# Patient Record
Sex: MALE | Race: WHITE | Employment: OTHER | URBAN - METROPOLITAN AREA
[De-identification: names, ages, dates, MRNs, and addresses within clinical notes are randomized per-mention and may not be internally consistent; named-entity substitution may affect disease eponyms.]

---

## 2023-01-20 ENCOUNTER — TELEPHONE (OUTPATIENT)
Dept: FAMILY MEDICINE CLINIC | Age: 68
End: 2023-01-20

## 2023-01-20 NOTE — TELEPHONE ENCOUNTER
----- Message from Ye Burgos sent at 1/19/2023 12:50 PM EST -----  Subject: Appointment Request    Reason for Call: New Patient/New to Provider Appointment needed: New   Patient Request Appointment    QUESTIONS    Reason for appointment request? Requested Provider unavailable - Iglesia Peterson     Additional Information for Provider?  Pt would like to establish w/Dr Edmond Nichols (pt dghtr Wendolyn Runner already pt with him) no appts   available; screened green  ---------------------------------------------------------------------------  --------------  [id number]  [phone]; OK to leave message on voicemail  ---------------------------------------------------------------------------  --------------  SCRIPT ANSWERS  COVID Screen: Mari Heard

## 2023-01-26 ENCOUNTER — OFFICE VISIT (OUTPATIENT)
Dept: FAMILY MEDICINE CLINIC | Age: 68
End: 2023-01-26
Payer: MEDICARE

## 2023-01-26 VITALS
OXYGEN SATURATION: 97 % | HEIGHT: 76 IN | TEMPERATURE: 97.5 F | DIASTOLIC BLOOD PRESSURE: 83 MMHG | SYSTOLIC BLOOD PRESSURE: 126 MMHG | HEART RATE: 55 BPM | BODY MASS INDEX: 26.06 KG/M2 | WEIGHT: 214 LBS

## 2023-01-26 DIAGNOSIS — H02.66 XANTHELASMA OF EYELID, BILATERAL: ICD-10-CM

## 2023-01-26 DIAGNOSIS — J45.30 MILD PERSISTENT ASTHMA WITHOUT COMPLICATION: ICD-10-CM

## 2023-01-26 DIAGNOSIS — H02.63 XANTHELASMA OF EYELID, BILATERAL: ICD-10-CM

## 2023-01-26 DIAGNOSIS — Z12.5 SPECIAL SCREENING FOR MALIGNANT NEOPLASM OF PROSTATE: ICD-10-CM

## 2023-01-26 DIAGNOSIS — L57.0 AK (ACTINIC KERATOSIS): Primary | ICD-10-CM

## 2023-01-26 DIAGNOSIS — Z13.6 SCREENING FOR ISCHEMIC HEART DISEASE: ICD-10-CM

## 2023-01-26 DIAGNOSIS — Z00.00 INITIAL MEDICARE ANNUAL WELLNESS VISIT: ICD-10-CM

## 2023-01-26 DIAGNOSIS — Z13.1 SCREENING FOR DIABETES MELLITUS: ICD-10-CM

## 2023-01-26 PROBLEM — J45.20 ASTHMA, MILD INTERMITTENT: Status: ACTIVE | Noted: 2023-01-26

## 2023-01-26 PROCEDURE — G8536 NO DOC ELDER MAL SCRN: HCPCS | Performed by: FAMILY MEDICINE

## 2023-01-26 PROCEDURE — 1101F PT FALLS ASSESS-DOCD LE1/YR: CPT | Performed by: FAMILY MEDICINE

## 2023-01-26 PROCEDURE — G8510 SCR DEP NEG, NO PLAN REQD: HCPCS | Performed by: FAMILY MEDICINE

## 2023-01-26 PROCEDURE — 3017F COLORECTAL CA SCREEN DOC REV: CPT | Performed by: FAMILY MEDICINE

## 2023-01-26 PROCEDURE — G0438 PPPS, INITIAL VISIT: HCPCS | Performed by: FAMILY MEDICINE

## 2023-01-26 PROCEDURE — 99203 OFFICE O/P NEW LOW 30 MIN: CPT | Performed by: FAMILY MEDICINE

## 2023-01-26 PROCEDURE — 1123F ACP DISCUSS/DSCN MKR DOCD: CPT | Performed by: FAMILY MEDICINE

## 2023-01-26 PROCEDURE — G8427 DOCREV CUR MEDS BY ELIG CLIN: HCPCS | Performed by: FAMILY MEDICINE

## 2023-01-26 PROCEDURE — G8417 CALC BMI ABV UP PARAM F/U: HCPCS | Performed by: FAMILY MEDICINE

## 2023-01-26 RX ORDER — VARDENAFIL HYDROCHLORIDE 10 MG/1
10 TABLET ORAL
COMMUNITY

## 2023-01-26 NOTE — PATIENT INSTRUCTIONS
Medicare Wellness Visit, Male    The best way to live healthy is to have a lifestyle where you eat a well-balanced diet, exercise regularly, limit alcohol use, and quit all forms of tobacco/nicotine, if applicable. Regular preventive services are another way to keep healthy. Preventive services (vaccines, screening tests, monitoring & exams) can help personalize your care plan, which helps you manage your own care. Screening tests can find health problems at the earliest stages, when they are easiest to treat. [facility] follows the current, evidence-based guidelines published by the [facility] Jhony Marilou ([facility]) when recommending preventive services for our patients. Because we follow these guidelines, sometimes recommendations change over time as research supports it. (For example, a prostate screening blood test is no longer routinely recommended for men with no symptoms). Of course, you and your doctor may decide to screen more often for some diseases, based on your risk and co-morbidities (chronic disease you are already diagnosed with). Preventive services for you include:  - Medicare offers their members a free annual wellness visit, which is time for you and your primary care provider to discuss and plan for your preventive service needs.  Take advantage of this benefit every year!    -Over the age of 72 should receive the recommended pneumonia vaccines.   -All adults should have a flu vaccine yearly.  -All adults should receive a tetanus vaccine every 10 years.  -Over the age of 48 should receive the shingles vaccines.    -All adults should be screened once for Hepatitis C.  -All adults age 38-68 who are overweight should have a diabetes screening test once every three years.   -Other screening tests & preventive services for persons with diabetes include: an eye exam to screen for diabetic retinopathy, a kidney function test, a foot exam, and stricter control over your cholesterol.   -Cardiovascular screening for adults with routine risk involves an electrocardiogram (ECG) at intervals determined by the provider.     -Colorectal cancer screening should be done for adults age 43-69 with no increased risk factors for colorectal cancer. There are a number of acceptable methods of screening for this type of cancer. Each test has its own benefits and drawbacks. Discuss with your provider what is most appropriate for you during your annual wellness visit. The different tests include: colonoscopy (considered the best screening method), a fecal occult blood test, a fecal DNA test, and sigmoidoscopy.    -Lung cancer screening is recommended annually with a low dose CT scan for adults between age 54 and 68, who have smoked at least 30 pack years (equivalent of 1 pack per day for 30 days), and who is a current smoker or quit less than 15 years ago. -An Abdominal Aortic Aneurysm (AAA) Screening is recommended for men age 73-68 who has ever smoked in their lifetime.      Here is a list of your current Health Maintenance items (your personalized list of preventive services) with a due date:  Health Maintenance Due   Topic Date Due    Hepatitis C Test  Never done    COVID-19 Vaccine (1) Never done    DTaP/Tdap/Td  (1 - Tdap) Never done    Cholesterol Test   Never done    Colorectal Screening  Never done    Shingles Vaccine (1 of 2) Never done    Pneumococcal Vaccine (1 - PCV) Never done    Yearly Flu Vaccine (1) Never done

## 2023-01-26 NOTE — PROGRESS NOTES
Identified pt with two pt identifiers. Reviewed record in preparation for visit and have obtained necessary documentation. All patient medications has been reviewed. Chief Complaint   Patient presents with    New Patient    Establish Care     Additional information about chief complaint:    Visit Vitals  /83 (BP 1 Location: Left upper arm, BP Patient Position: Sitting, BP Cuff Size: Adult)   Pulse (!) 55   Temp 97.5 °F (36.4 °C) (Temporal)   Ht 6' 4\" (1.93 m)   Wt 214 lb (97.1 kg)   SpO2 97%   BMI 26.05 kg/m²       Health Maintenance Due   Topic    Hepatitis C Screening     Depression Screen     COVID-19 Vaccine (1)    DTaP/Tdap/Td series (1 - Tdap)    Lipid Screen     Colorectal Cancer Screening Combo     Shingles Vaccine (1 of 2)    Pneumococcal 65+ years (1 - PCV)    Flu Vaccine (1)    Medicare Yearly Exam        1. Have you been to the ER, urgent care clinic since your last visit? Hospitalized since your last visit? N/a    2. Have you seen or consulted any other health care providers outside of the [address] [place] [address] since your last visit? Include any pap smears or colon screening.  N/a

## 2023-01-26 NOTE — PROGRESS NOTES
Family Medicine Initial Office Visit  Patient: Tisha Ness  1955, 75 y.o., male  Encounter Date: 1/26/2023    ASSESSMENT & PLAN  75 y.o. male with asthma presenting for intake appointment today as well as medicare wellness visit initial. Well controlled asthma. No immediate concerns but has a couple skin questions. Findings consistent with xanthelasmas of eyelids which could be a sign of hyperlipidemia/dyslipidemia. Forehead scaling consistent with Actinic keratosis. Recommend dermatology for Aks and discuss bags under eyes. We will obtain lipid screening and glucose for cardiovascular and diabetes risk, and PSA for age and background; no indication for ROCKY today. Patient should follow up with us in 2 weeks after labs resulted to discuss results. Chronic Conditions Addressed Today       1. Asthma, mild persistent     Other Problems Addressed Today       AK (actinic keratosis)    -  Primary        Relevant Orders        REFERRAL TO DERMATOLOGY    Xanthelasma of eyelid, bilateral        Initial Medicare annual wellness visit        Screening for diabetes mellitus            Relevant Orders        GLUCOSE, RANDOM    Screening for ischemic heart disease            Relevant Orders        LIPID PANEL    Special screening for malignant neoplasm of prostate        Discussed the potential benefits and harms of the prostate-specific antigen (PSA) serum level test as a screening tool for early prostate cancer detection. Relevant Orders        PSA SCREENING (SCREENING)              Orders Placed This Encounter    Glucose, Random ([id number]) - Future Default     Standing Status:   Future     Standing Expiration Date:   1/26/2024     Order Specific Question:   Has the patient fasted?      Answer:   Yes     Order Specific Question:   Patient Height     Answer:   6     Order Specific Question:   Patient Weight     Answer:   214    Lipid Panel ([id number]) - Future Default     Standing Status:   Future     Standing Expiration Date:   1/26/2024    PSA Screening-Future  ([id number])     Standing Status:   Future     Standing Expiration Date:   7/25/2023    REFERRAL TO DERMATOLOGY     Referral Priority:   Routine     Referral Type:   Consultation     Referral Reason:   Specialty Services Required     Referred to Provider:   Rusty Gil MD     Requested Specialty:   Dermatology Physician     Number of Visits Requested:   1    vardenafiL (LEVITRA) 10 mg tablet     Sig: Take 10 mg by mouth daily as needed. Patient Instructions   Medicare Wellness Visit, Male    The best way to live healthy is to have a lifestyle where you eat a well-balanced diet, exercise regularly, limit alcohol use, and quit all forms of tobacco/nicotine, if applicable. Regular preventive services are another way to keep healthy. Preventive services (vaccines, screening tests, monitoring & exams) can help personalize your care plan, which helps you manage your own care. Screening tests can find health problems at the earliest stages, when they are easiest to treat. [facility] follows the current, evidence-based guidelines published by the [facility]hony Marilou ([facility]) when recommending preventive services for our patients. Because we follow these guidelines, sometimes recommendations change over time as research supports it. (For example, a prostate screening blood test is no longer routinely recommended for men with no symptoms). Of course, you and your doctor may decide to screen more often for some diseases, based on your risk and co-morbidities (chronic disease you are already diagnosed with). Preventive services for you include:  - Medicare offers their members a free annual wellness visit, which is time for you and your primary care provider to discuss and plan for your preventive service needs.  Take advantage of this benefit every year!    -Over the age of 72 should receive the recommended pneumonia vaccines.   -All adults should have a flu vaccine yearly.  -All adults should receive a tetanus vaccine every 10 years.  -Over the age of 48 should receive the shingles vaccines.    -All adults should be screened once for Hepatitis C.  -All adults age 38-68 who are overweight should have a diabetes screening test once every three years.   -Other screening tests & preventive services for persons with diabetes include: an eye exam to screen for diabetic retinopathy, a kidney function test, a foot exam, and stricter control over your cholesterol.   -Cardiovascular screening for adults with routine risk involves an electrocardiogram (ECG) at intervals determined by the provider.     -Colorectal cancer screening should be done for adults age 43-69 with no increased risk factors for colorectal cancer. There are a number of acceptable methods of screening for this type of cancer. Each test has its own benefits and drawbacks. Discuss with your provider what is most appropriate for you during your annual wellness visit. The different tests include: colonoscopy (considered the best screening method), a fecal occult blood test, a fecal DNA test, and sigmoidoscopy.    -Lung cancer screening is recommended annually with a low dose CT scan for adults between age 54 and 68, who have smoked at least 30 pack years (equivalent of 1 pack per day for 30 days), and who is a current smoker or quit less than 15 years ago. -An Abdominal Aortic Aneurysm (AAA) Screening is recommended for men age 73-68 who has ever smoked in their lifetime.      Here is a list of your current Health Maintenance items (your personalized list of preventive services) with a due date:  Health Maintenance Due   Topic Date Due    Hepatitis C Test  Never done    COVID-19 Vaccine (1) Never done    DTaP/Tdap/Td  (1 - Tdap) Never done    Cholesterol Test   Never done    Colorectal Screening  Never done    Shingles Vaccine (1 of 2) Never done Pneumococcal Vaccine (1 - PCV) Never done    Yearly Flu Vaccine (1) Never done     CHIEF COMPLAINT  Chief Complaint   Patient presents with    New Patient    [address] is a 75 y.o. male presenting today for establishing care. 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 Wanting to start performing at open boom nights at [facility]. Has a recording studio in the house. Patient lives in a house in the [place] with Jing Kramer and her daughter Kassi Wagoner. Just moved from [place] to [place]. Patient was last seen by primary care couple years ago. Dr. Jack Martins at [facility] in [facility]. Patient last saw a dentist last year. Looking a new one in [facility]. Patient last had eye exam recently, no concerns, no cataracts to intervene on. Exercise: not too active now, but during warm months he goes hiking. Has a gym in the new house. Rowing machine and incline treadmill. Diet / Weight:mostly vegetables with mixed meats. Avoid sugars except for Peanut M&Ms. Tobacco: never  EtOH: 4 glass/week  Illicit Substances: not in a long time; in his 25s he did acid a little bit, and some other substances like peyote. Sexual Activity:yes with partner of 5 years Lindsey. Will take a Levitra 10mg once in a while. Review of Systems   Constitutional: Negative. HENT: Negative. Eyes: Negative. Respiratory: Negative. Cardiovascular: Negative. Gastrointestinal: Negative. Endocrine: Negative. Genitourinary: Negative. Musculoskeletal: Negative. Neurological: Negative. Hematological: Negative. Psychiatric/Behavioral: Negative. Chronic Conditions Addressed Today       1. Asthma, mild persistent     Overview      Uses a daily diskus.           Acute Diagnoses Addressed Today       AK (actinic keratosis)    -  Primary        Relevant Orders        REFERRAL TO DERMATOLOGY    Xanthelasma of eyelid, bilateral        Initial Medicare annual wellness visit        Screening for diabetes mellitus            Relevant Orders        GLUCOSE, RANDOM    Screening for ischemic heart disease            Relevant Orders        LIPID PANEL    Special screening for malignant neoplasm of prostate        Discussed the potential benefits and harms of the prostate-specific antigen (PSA) serum level test as a screening tool for early prostate cancer detection. Relevant Orders        PSA SCREENING (SCREENING)             OBJECTIVE  Visit Vitals  /83 (BP 1 Location: Left upper arm, BP Patient Position: Sitting, BP Cuff Size: Adult)   Pulse (!) 55   Temp 97.5 °F (36.4 °C) (Temporal)   Ht 6' 4\" (1.93 m)   Wt 214 lb (97.1 kg)   SpO2 97%   BMI 26.05 kg/m²       Physical Exam  Vitals reviewed. Constitutional:       General: He is not in acute distress. Appearance: Normal appearance. He is not ill-appearing. HENT:      Head: Normocephalic and atraumatic. Right Ear: Tympanic membrane, ear canal and external ear normal.      Left Ear: Tympanic membrane, ear canal and external ear normal.      Nose: Nose normal.      Mouth/Throat:      Mouth: Mucous membranes are moist.      Pharynx: Oropharynx is clear. No posterior oropharyngeal erythema. Eyes:      General:         Right eye: No discharge. Left eye: No discharge. Extraocular Movements: Extraocular movements intact. Conjunctiva/sclera: Conjunctivae normal.      Pupils: Pupils are equal, round, and reactive to light. Neck:      Vascular: No carotid bruit. Cardiovascular:      Rate and Rhythm: Normal rate and regular rhythm. Pulses: Normal pulses. Heart sounds: No murmur heard. No friction rub. No gallop. Pulmonary:      Effort: Pulmonary effort is normal. No respiratory distress. Breath sounds: Normal breath sounds. No wheezing, rhonchi or rales. Abdominal:      General: Bowel sounds are normal. There is no distension.       Palpations: Abdomen is soft. Tenderness: There is no abdominal tenderness. There is no guarding. Musculoskeletal:         General: No swelling, tenderness, deformity or signs of injury. Normal range of motion. Cervical back: Normal range of motion. No rigidity or tenderness. Lymphadenopathy:      Cervical: No cervical adenopathy. Skin:     General: Skin is warm and dry. Capillary Refill: Capillary refill takes less than 2 seconds. Coloration: Skin is not jaundiced. Findings: No bruising, erythema or rash. Comments: Xanthelasmas bilateral under eyes. Actinic keratosis x3-4 on forehead   Neurological:      General: No focal deficit present. Mental Status: He is alert and oriented to person, place, and time. Mental status is at baseline. Sensory: No sensory deficit. Motor: No weakness. Coordination: Coordination normal.   Psychiatric:         Mood and Affect: Mood normal.         Behavior: Behavior normal.         Thought Content: Thought content normal.         Judgment: Judgment normal.     No results found for any visits on 01/26/23. HISTORICAL  Reviewed and updated today, and as noted below:    History reviewed. No pertinent past medical history. Past Surgical History:   Procedure Laterality Date    HX ORTHOPAEDIC       Family History   Problem Relation Age of Onset    Cancer Mother     Arthritis-rheumatoid Father      Social History     Tobacco Use   Smoking Status Never   Smokeless Tobacco Never     Social History     Socioeconomic History    Marital status: SINGLE   Tobacco Use    Smoking status: Never    Smokeless tobacco: Never   Vaping Use    Vaping Use: Never used   Substance and Sexual Activity    Alcohol use: Yes     Comment: 1-2 glasses a wine a week    Drug use: Never     No Known Allergies    No results found for any previous visit.          Judi Harrison MD  [facility]  01/26/23 3:01 PM    Total time on the date of encounter exceeded 30 minutes and included patient care, coordination of care, charting and preparation for visit. Portions of this note may have been populated using smart dictation software and may have \"sounds-like\" errors present. Pt was counseled on risks, benefits and alternatives of treatment options. All questions were asked and answered and the patient was agreeable with the treatment plan as outlined. This is an Initial Medicare Annual Wellness Exam (AWV) (Performed 12 months after IPPE or effective date of Medicare Part B enrollment, Once in a lifetime)    I have reviewed the patient's medical history in detail and updated the computerized patient record. Assessment/Plan   Education and counseling provided:  Are appropriate based on today's review and evaluation  Prostate cancer screening tests (PSA, covered annually)  Colorectal cancer screening tests  Cardiovascular screening blood test  Diabetes screening test    1. AK (actinic keratosis)  -     REFERRAL TO DERMATOLOGY  2. Mild persistent asthma without complication  3. Xanthelasma of eyelid, bilateral  4. Initial Medicare annual wellness visit  5. Screening for diabetes mellitus  -     GLUCOSE, RANDOM; Future  6. Screening for ischemic heart disease  -     LIPID PANEL; Future  7. Special screening for malignant neoplasm of prostate  Comments:  Discussed the potential benefits and harms of the prostate-specific antigen (PSA) serum level test as a screening tool for early prostate cancer detection. Orders:  -     PSA SCREENING (SCREENING);  Future       Depression Risk Factor Screening     3 most recent PHQ Screens 1/26/2023   Little interest or pleasure in doing things Not at all   Feeling down, depressed, irritable, or hopeless Not at all   Total Score PHQ 2 0       Alcohol & Drug Abuse Risk Screen    Do you average more than 1 drink per night or more than 7 drinks a week: No    In the past three months have you have had more than 4 drinks containing alcohol on one occasion: No          Functional Ability and Level of Safety    Hearing: Hearing is good. Activities of Daily Living: The home contains: handrails and grab bars  Patient does total self care     Ambulation: with no difficulty      Fall Risk:  Fall Risk Assessment, last 12 mths 1/26/2023   Able to walk? Yes   Fall in past 12 months? 0      Abuse Screen:  Patient is not abused       Cognitive Screening    Has your family/caregiver stated any concerns about your memory: no     Cognitive Screening: Normal - Clock Drawing Test    Health Maintenance Due     Health Maintenance Due   Topic Date Due    Hepatitis C Screening  Never done    COVID-19 Vaccine (1) Never done    DTaP/Tdap/Td series (1 - Tdap) Never done    Lipid Screen  Never done    Colorectal Cancer Screening Combo  Never done    Shingles Vaccine (1 of 2) Never done    Pneumococcal 65+ years (1 - PCV) Never done    Flu Vaccine (1) Never done       Patient Care Team   Patient Care Team:  Ramírez Boston MD as PCP - General (Family Medicine)    History     Patient Active Problem List   Diagnosis Code    Asthma, mild persistent J45.30     History reviewed. No pertinent past medical history. Past Surgical History:   Procedure Laterality Date    HX ORTHOPAEDIC       Current Outpatient Medications   Medication Sig Dispense Refill    vardenafiL (LEVITRA) 10 mg tablet Take 10 mg by mouth daily as needed.        No Known Allergies    Family History   Problem Relation Age of Onset    Cancer Mother     Arthritis-rheumatoid Father      Social History     Tobacco Use    Smoking status: Never    Smokeless tobacco: Never   Substance Use Topics    Alcohol use: Yes     Comment: 1-2 glasses a wine a week       Fco Patel MD

## 2023-02-09 ENCOUNTER — OFFICE VISIT (OUTPATIENT)
Dept: FAMILY MEDICINE CLINIC | Age: 68
End: 2023-02-09
Payer: MEDICARE

## 2023-02-09 VITALS
WEIGHT: 210 LBS | BODY MASS INDEX: 25.57 KG/M2 | RESPIRATION RATE: 16 BRPM | SYSTOLIC BLOOD PRESSURE: 116 MMHG | HEIGHT: 76 IN | HEART RATE: 67 BPM | TEMPERATURE: 97 F | OXYGEN SATURATION: 97 % | DIASTOLIC BLOOD PRESSURE: 82 MMHG

## 2023-02-09 DIAGNOSIS — E78.5 HYPERLIPIDEMIA, MILD: Primary | ICD-10-CM

## 2023-02-09 DIAGNOSIS — H02.66 XANTHELASMA OF EYELID, BILATERAL: ICD-10-CM

## 2023-02-09 DIAGNOSIS — H02.63 XANTHELASMA OF EYELID, BILATERAL: ICD-10-CM

## 2023-02-09 DIAGNOSIS — N52.9 ERECTILE DYSFUNCTION, UNSPECIFIED ERECTILE DYSFUNCTION TYPE: ICD-10-CM

## 2023-02-09 PROBLEM — L57.0 AK (ACTINIC KERATOSIS): Status: ACTIVE | Noted: 2023-02-09

## 2023-02-09 PROCEDURE — 1101F PT FALLS ASSESS-DOCD LE1/YR: CPT | Performed by: FAMILY MEDICINE

## 2023-02-09 PROCEDURE — G8427 DOCREV CUR MEDS BY ELIG CLIN: HCPCS | Performed by: FAMILY MEDICINE

## 2023-02-09 PROCEDURE — G8510 SCR DEP NEG, NO PLAN REQD: HCPCS | Performed by: FAMILY MEDICINE

## 2023-02-09 PROCEDURE — 3017F COLORECTAL CA SCREEN DOC REV: CPT | Performed by: FAMILY MEDICINE

## 2023-02-09 PROCEDURE — 1123F ACP DISCUSS/DSCN MKR DOCD: CPT | Performed by: FAMILY MEDICINE

## 2023-02-09 PROCEDURE — G8417 CALC BMI ABV UP PARAM F/U: HCPCS | Performed by: FAMILY MEDICINE

## 2023-02-09 PROCEDURE — 99215 OFFICE O/P EST HI 40 MIN: CPT | Performed by: FAMILY MEDICINE

## 2023-02-09 PROCEDURE — G8536 NO DOC ELDER MAL SCRN: HCPCS | Performed by: FAMILY MEDICINE

## 2023-02-09 RX ORDER — ROSUVASTATIN CALCIUM 5 MG/1
5 TABLET, COATED ORAL
Qty: 60 TABLET | Refills: 1 | Status: SHIPPED | OUTPATIENT
Start: 2023-02-09

## 2023-02-09 RX ORDER — FLUTICASONE PROPIONATE AND SALMETEROL 250; 50 UG/1; UG/1
1 POWDER RESPIRATORY (INHALATION) EVERY 12 HOURS
COMMUNITY
End: 2023-02-09 | Stop reason: SDUPTHER

## 2023-02-09 RX ORDER — FLUTICASONE PROPIONATE AND SALMETEROL 250; 50 UG/1; UG/1
1 POWDER RESPIRATORY (INHALATION) EVERY 12 HOURS
Qty: 180 EACH | Refills: 3 | Status: SHIPPED | OUTPATIENT
Start: 2023-02-09

## 2023-02-09 RX ORDER — VARDENAFIL HYDROCHLORIDE 10 MG/1
10 TABLET ORAL
Qty: 80 TABLET | Refills: 0 | Status: SHIPPED | OUTPATIENT
Start: 2023-02-09

## 2023-02-09 NOTE — PROGRESS NOTES
Christine Lopez (: 1955) is a 75 y.o. male, established patient, here for evaluation of the following chief complaint(s):  Follow-up (Fu lab results and needs refills /)       ASSESSMENT/PLAN:  Below is the assessment and plan developed based on review of pertinent history, physical exam, labs, studies, and medications. 1. Hyperlipidemia, mild  Assessment & Plan:   uncontrolled, changes made today: we will start statin therapy, with titration up to moderate/high intensity. Start Crestor 5mg nightly, recheck lipids in 2 months  We spent a good portion of the visit discussing the relationship between xanthelasma, erectile dysfunction, and cardiovascular health, as well las discussing the risk/benefits of statin therapy. Orders:  -     LIPID PANEL; Future  2. Erectile dysfunction, unspecified erectile dysfunction type  Assessment & Plan:   at goal, continue current medications  3. Xanthelasma of eyelid, bilateral  Assessment & Plan:  Not looking for removal today. Patient will start statin for cholesterol therapy, and this may improve skin findings. Patient will also see dermatology soon for AKs and can ask about removal from them. Orders:  -     LIPID PANEL; Future    The 10-year ASCVD risk score (Lyssa CHANG, et al., 2019) is: 12.9%    Values used to calculate the score:      Age: 75 years      Sex: Male      Is Non- : No      Diabetic: No      Tobacco smoker: No      Systolic Blood Pressure: 251 mmHg      Is BP treated: No      HDL Cholesterol: 56 MG/DL      Total Cholesterol: 204 MG/DL    No follow-ups on file. SUBJECTIVE/OBJECTIVE:  HPI  Chronic Conditions Addressed Today       1. Erectile dysfunction     Overview      Organic, seems to have slightly worsened over a decade. Responsive to 10mg of vardenafil but occasionally needs two tablets.                     2. Hyperlipidemia, mild - Primary     Overview      Lab Results   Component Value Date/Time    Cholesterol, total 204 (H) 01/30/2023 04:00 PM    HDL Cholesterol 56 01/30/2023 04:00 PM    LDL, calculated 128.4 (H) 01/30/2023 04:00 PM    VLDL, calculated 19.6 01/30/2023 04:00 PM    Triglyceride 98 01/30/2023 04:00 PM    CHOL/HDL Ratio 3.6 01/30/2023 04:00 PM   The 10-year ASCVD risk score (Lyssa DK, et al., 2019) is: 12.9%    Values used to calculate the score:      Age: 75 years      Sex: Male      Is Non- : No      Diabetic: No      Tobacco smoker: No      Systolic Blood Pressure: 761 mmHg      Is BP treated: No      HDL Cholesterol: 56 MG/DL      Total Cholesterol: 204 MG/DL                                          3. Xanthelasma of eyelid, bilateral     Overview      Found on first visit. Labs show elevated , patient noticing another one forming now. No definite family history of ASCVD. Review of Systems  As per HPI    Physical Exam  Visit Vitals  /82   Pulse 67   Temp 97 °F (36.1 °C)   Resp 16   Ht 6' 4\" (1.93 m)   Wt 210 lb (95.3 kg)   SpO2 97%   BMI 25.56 kg/m²     Constitutional: well appearing, no acute distress  Cardiac: normal rate, regular rhythm. Pulm: normal rate and effort  Skin: normal color and turgor. Xanthelasmas bilateral lower eyelids. On this date 02/09/2023 I have spent 40 minutes reviewing previous notes, test results and face to face with the patient discussing the diagnosis and importance of compliance with the treatment plan as well as documenting on the day of the visit. An electronic signature was used to authenticate this note.   -- Radha Kelly MD

## 2023-02-09 NOTE — ASSESSMENT & PLAN NOTE
Not looking for removal today. Patient will start statin for cholesterol therapy, and this may improve skin findings. Patient will also see dermatology soon for AKs and can ask about removal from them.

## 2023-02-09 NOTE — ASSESSMENT & PLAN NOTE
uncontrolled, changes made today: we will start statin therapy, with titration up to moderate/high intensity.  Start Crestor 5mg nightly, recheck lipids in 2 months

## 2023-04-20 LAB
CHOLEST SERPL-MCNC: 175 MG/DL (ref 100–199)
HDLC SERPL-MCNC: 66 MG/DL
IMP & REVIEW OF LAB RESULTS: NORMAL
LDLC SERPL CALC-MCNC: 98 MG/DL (ref 0–99)
TRIGL SERPL-MCNC: 58 MG/DL (ref 0–149)
VLDLC SERPL CALC-MCNC: 11 MG/DL (ref 5–40)

## 2023-04-29 DIAGNOSIS — E78.5 HYPERLIPIDEMIA, MILD: Primary | ICD-10-CM

## 2023-05-02 ENCOUNTER — OFFICE VISIT (OUTPATIENT)
Dept: FAMILY MEDICINE CLINIC | Age: 68
End: 2023-05-02
Payer: MEDICARE

## 2023-05-02 VITALS
BODY MASS INDEX: 25.82 KG/M2 | TEMPERATURE: 98.2 F | DIASTOLIC BLOOD PRESSURE: 89 MMHG | HEIGHT: 76 IN | HEART RATE: 62 BPM | WEIGHT: 212 LBS | SYSTOLIC BLOOD PRESSURE: 126 MMHG | RESPIRATION RATE: 16 BRPM

## 2023-05-02 DIAGNOSIS — E78.5 HYPERLIPIDEMIA, MILD: Primary | ICD-10-CM

## 2023-05-02 PROCEDURE — 99214 OFFICE O/P EST MOD 30 MIN: CPT | Performed by: FAMILY MEDICINE

## 2023-05-02 PROCEDURE — 1123F ACP DISCUSS/DSCN MKR DOCD: CPT | Performed by: FAMILY MEDICINE

## 2023-05-02 PROCEDURE — G8536 NO DOC ELDER MAL SCRN: HCPCS | Performed by: FAMILY MEDICINE

## 2023-05-02 PROCEDURE — G8427 DOCREV CUR MEDS BY ELIG CLIN: HCPCS | Performed by: FAMILY MEDICINE

## 2023-05-02 PROCEDURE — 3017F COLORECTAL CA SCREEN DOC REV: CPT | Performed by: FAMILY MEDICINE

## 2023-05-02 PROCEDURE — G8417 CALC BMI ABV UP PARAM F/U: HCPCS | Performed by: FAMILY MEDICINE

## 2023-05-02 PROCEDURE — 1101F PT FALLS ASSESS-DOCD LE1/YR: CPT | Performed by: FAMILY MEDICINE

## 2023-05-02 PROCEDURE — G8510 SCR DEP NEG, NO PLAN REQD: HCPCS | Performed by: FAMILY MEDICINE

## 2023-05-02 RX ORDER — ROSUVASTATIN CALCIUM 5 MG/1
5 TABLET, COATED ORAL
Qty: 12 TABLET | Refills: 2 | Status: SHIPPED | OUTPATIENT
Start: 2023-05-02

## 2023-08-22 NOTE — TELEPHONE ENCOUNTER
Faxed refill request for Vardenafil 10 mg (generic equivalent for Levitra) received from [facility]. See faxed form placed in provider's bin. Can nurse please pend medication to provider?  Ronald

## 2023-08-24 RX ORDER — VARDENAFIL HYDROCHLORIDE 10 MG/1
10 TABLET ORAL DAILY PRN
Qty: 90 TABLET | Refills: 3 | Status: SHIPPED | OUTPATIENT
Start: 2023-08-24

## 2023-08-30 ENCOUNTER — TELEPHONE (OUTPATIENT)
Facility: CLINIC | Age: 68
End: 2023-08-30

## 2023-10-11 ENCOUNTER — TELEPHONE (OUTPATIENT)
Facility: CLINIC | Age: 68
End: 2023-10-11

## 2023-10-11 ENCOUNTER — HOSPITAL ENCOUNTER (EMERGENCY)
Facility: HOSPITAL | Age: 68
Discharge: HOME | End: 2023-10-11
Attending: NURSE PRACTITIONER | Admitting: NURSE PRACTITIONER
Payer: MEDICARE

## 2023-10-11 VITALS
RESPIRATION RATE: 16 BRPM | HEIGHT: 76 IN | OXYGEN SATURATION: 97 % | WEIGHT: 210 LBS | SYSTOLIC BLOOD PRESSURE: 140 MMHG | BODY MASS INDEX: 25.57 KG/M2 | TEMPERATURE: 98.4 F | HEART RATE: 70 BPM | DIASTOLIC BLOOD PRESSURE: 92 MMHG

## 2023-10-11 DIAGNOSIS — B08.4 HAND, FOOT AND MOUTH DISEASE: ICD-10-CM

## 2023-10-11 DIAGNOSIS — I89.1: ICD-10-CM

## 2023-10-11 LAB
ALBUMIN SERPL BCG-MCNC: 3.6 G/DL (ref 3.5–5.2)
ALP SERPL-CCNC: 231 U/L (ref 40–129)
ALT SERPL W P-5'-P-CCNC: 35 U/L (ref 0–70)
ANION GAP SERPL CALCULATED.3IONS-SCNC: 10 MMOL/L (ref 7–15)
AST SERPL W P-5'-P-CCNC: 35 U/L (ref 0–45)
BASO+EOS+MONOS # BLD AUTO: ABNORMAL 10*3/UL
BASO+EOS+MONOS NFR BLD AUTO: ABNORMAL %
BASOPHILS # BLD AUTO: 0 10E3/UL (ref 0–0.2)
BASOPHILS NFR BLD AUTO: 1 %
BILIRUB SERPL-MCNC: 0.5 MG/DL
BUN SERPL-MCNC: 16.5 MG/DL (ref 8–23)
CALCIUM SERPL-MCNC: 9.7 MG/DL (ref 8.8–10.2)
CHLORIDE SERPL-SCNC: 99 MMOL/L (ref 98–107)
CREAT SERPL-MCNC: 0.73 MG/DL (ref 0.67–1.17)
DEPRECATED HCO3 PLAS-SCNC: 25 MMOL/L (ref 22–29)
EGFRCR SERPLBLD CKD-EPI 2021: >90 ML/MIN/1.73M2
EOSINOPHIL # BLD AUTO: 0.1 10E3/UL (ref 0–0.7)
EOSINOPHIL NFR BLD AUTO: 2 %
ERYTHROCYTE [DISTWIDTH] IN BLOOD BY AUTOMATED COUNT: 12.9 % (ref 10–15)
GLUCOSE SERPL-MCNC: 110 MG/DL (ref 70–99)
HCT VFR BLD AUTO: 39 % (ref 40–53)
HGB BLD-MCNC: 13.1 G/DL (ref 13.3–17.7)
HOLD SPECIMEN: NORMAL
IMM GRANULOCYTES # BLD: 0.1 10E3/UL
IMM GRANULOCYTES NFR BLD: 1 %
LYMPHOCYTES # BLD AUTO: 1.4 10E3/UL (ref 0.8–5.3)
LYMPHOCYTES NFR BLD AUTO: 16 %
MCH RBC QN AUTO: 29.2 PG (ref 26.5–33)
MCHC RBC AUTO-ENTMCNC: 33.6 G/DL (ref 31.5–36.5)
MCV RBC AUTO: 87 FL (ref 78–100)
MONOCYTES # BLD AUTO: 0.8 10E3/UL (ref 0–1.3)
MONOCYTES NFR BLD AUTO: 9 %
NEUTROPHILS # BLD AUTO: 6.2 10E3/UL (ref 1.6–8.3)
NEUTROPHILS NFR BLD AUTO: 71 %
NRBC # BLD AUTO: 0 10E3/UL
NRBC BLD AUTO-RTO: 0 /100
PLATELET # BLD AUTO: 219 10E3/UL (ref 150–450)
POTASSIUM SERPL-SCNC: 4.4 MMOL/L (ref 3.4–5.3)
PROT SERPL-MCNC: 7.5 G/DL (ref 6.4–8.3)
RBC # BLD AUTO: 4.49 10E6/UL (ref 4.4–5.9)
SODIUM SERPL-SCNC: 134 MMOL/L (ref 135–145)
WBC # BLD AUTO: 8.7 10E3/UL (ref 4–11)

## 2023-10-11 PROCEDURE — 36415 COLL VENOUS BLD VENIPUNCTURE: CPT | Performed by: NURSE PRACTITIONER

## 2023-10-11 PROCEDURE — 99213 OFFICE O/P EST LOW 20 MIN: CPT | Performed by: NURSE PRACTITIONER

## 2023-10-11 PROCEDURE — G0463 HOSPITAL OUTPT CLINIC VISIT: HCPCS

## 2023-10-11 PROCEDURE — 80053 COMPREHEN METABOLIC PANEL: CPT | Performed by: NURSE PRACTITIONER

## 2023-10-11 PROCEDURE — 85025 COMPLETE CBC W/AUTO DIFF WBC: CPT | Performed by: NURSE PRACTITIONER

## 2023-10-11 RX ORDER — NYSTATIN 100000/ML
500000 SUSPENSION, ORAL (FINAL DOSE FORM) ORAL 4 TIMES DAILY
Qty: 280 ML | Refills: 0 | Status: SHIPPED | OUTPATIENT
Start: 2023-10-11 | End: 2023-10-25

## 2023-10-11 RX ORDER — CEPHALEXIN 500 MG/1
500 CAPSULE ORAL 4 TIMES DAILY
Qty: 20 CAPSULE | Refills: 0 | Status: SHIPPED | OUTPATIENT
Start: 2023-10-11 | End: 2023-10-16

## 2023-10-11 ASSESSMENT — ENCOUNTER SYMPTOMS
DIARRHEA: 0
NAUSEA: 0
SINUS PAIN: 1
RHINORRHEA: 0
SHORTNESS OF BREATH: 0
ACTIVITY CHANGE: 1
SORE THROAT: 0
MYALGIAS: 1
COUGH: 0
CHILLS: 1
HEADACHES: 1
APPETITE CHANGE: 0
FEVER: 1
VOMITING: 1
SINUS PRESSURE: 1
FATIGUE: 1

## 2023-10-11 NOTE — ED PROVIDER NOTES
"  History     Chief Complaint   Patient presents with    not feeling well     HPI  Rogelio Garcia is a 68 year old male who is accompanied per his wife.  He presents with a 1 week history of chills, fatigue, fever, mouth sores, sinus pain and pressure, body aches, and a headache.  Has taken Advil and Tylenol this morning that does help with the discomfort.  Exposed to COVID 2 weeks ago.  Had a negative home COVID test today.  Otherwise, no known sick contacts.  No recent vaccines; has had initial 2 COVID vaccines.  Smokes cigars; does not inhale.  Denies nausea, vomiting, diarrhea, and shortness of breath.  Denies tick bites.     Allergies:  No Known Allergies    Problem List:    There are no problems to display for this patient.       Past Medical History:    History reviewed. No pertinent past medical history.    Past Surgical History:    History reviewed. No pertinent surgical history.    Family History:    History reviewed. No pertinent family history.    Social History:  Marital Status:   [2]        Medications:    cephALEXin (KEFLEX) 500 MG capsule  nystatin (MYCOSTATIN) 617914 UNIT/ML suspension          Review of Systems   Constitutional:  Positive for activity change, chills, fatigue and fever. Negative for appetite change.   HENT:  Positive for mouth sores, sinus pressure and sinus pain. Negative for ear pain, rhinorrhea and sore throat.    Eyes:         Eyes hurt at times   Respiratory:  Negative for cough and shortness of breath.    Gastrointestinal:  Positive for vomiting (none in past 24 hours). Negative for diarrhea and nausea.   Genitourinary: Negative.    Musculoskeletal:  Positive for myalgias.   Skin:         Pimples on back   Neurological:  Positive for headaches.       Physical Exam   BP: 140/92  Pulse: 70  Temp: 98.4  F (36.9  C)  Resp: 16  Height: 193 cm (6' 4\")  Weight: 95.3 kg (210 lb)  SpO2: 97 %      Physical Exam  Vitals and nursing note reviewed.   Constitutional:       " General: He is in acute distress (Mild to moderate).      Appearance: He is normal weight.   HENT:      Head: Normocephalic.      Jaw: There is normal jaw occlusion.      Right Ear: Tympanic membrane and ear canal normal.      Left Ear: Tympanic membrane and ear canal normal.      Nose: Nose normal.      Right Sinus: No maxillary sinus tenderness or frontal sinus tenderness.      Left Sinus: No maxillary sinus tenderness or frontal sinus tenderness.      Mouth/Throat:      Lips: Pink.      Mouth: Mucous membranes are moist.      Tongue: Lesions present.      Pharynx: Uvula midline. No posterior oropharyngeal erythema.     Eyes:      Conjunctiva/sclera: Conjunctivae normal.   Cardiovascular:      Rate and Rhythm: Normal rate and regular rhythm.      Heart sounds: Normal heart sounds. No murmur heard.  Pulmonary:      Effort: Pulmonary effort is normal. No respiratory distress.      Breath sounds: Normal breath sounds. No wheezing or rales.   Lymphadenopathy:      Cervical: No cervical adenopathy.   Skin:     General: Skin is warm and dry.      Findings: Erythema and rash present. Rash is macular and papular.          Neurological:      Mental Status: He is alert and oriented to person, place, and time.   Psychiatric:         Behavior: Behavior normal.         ED Course                 Procedures             Results for orders placed or performed during the hospital encounter of 10/11/23 (from the past 24 hour(s))   CBC with platelets differential    Narrative    The following orders were created for panel order CBC with platelets differential.  Procedure                               Abnormality         Status                     ---------                               -----------         ------                     CBC with platelets and d...[[id number]]  Abnormal            Final result                 Please view results for these tests on the individual orders.   Comprehensive metabolic panel   Result Value Ref  Range    Sodium 134 (L) 135 - 145 mmol/L    Potassium 4.4 3.4 - 5.3 mmol/L    Carbon Dioxide (CO2) 25 22 - 29 mmol/L    Anion Gap 10 7 - 15 mmol/L    Urea Nitrogen 16.5 8.0 - 23.0 mg/dL    Creatinine 0.73 0.67 - 1.17 mg/dL    GFR Estimate >90 >60 mL/min/1.73m2    Calcium 9.7 8.8 - 10.2 mg/dL    Chloride 99 98 - 107 mmol/L    Glucose 110 (H) 70 - 99 mg/dL    Alkaline Phosphatase 231 (H) 40 - 129 U/L    AST 35 0 - 45 U/L    ALT 35 0 - 70 U/L    Protein Total 7.5 6.4 - 8.3 g/dL    Albumin 3.6 3.5 - 5.2 g/dL    Bilirubin Total 0.5 <=1.2 mg/dL   CBC with platelets and differential   Result Value Ref Range    WBC Count 8.7 4.0 - 11.0 10e3/uL    RBC Count 4.49 4.40 - 5.90 10e6/uL    Hemoglobin 13.1 (L) 13.3 - 17.7 g/dL    Hematocrit 39.0 (L) 40.0 - 53.0 %    MCV 87 78 - 100 fL    MCH 29.2 26.5 - 33.0 pg    MCHC 33.6 31.5 - 36.5 g/dL    RDW 12.9 10.0 - 15.0 %    Platelet Count 219 150 - 450 10e3/uL    % Neutrophils 71 %    % Lymphocytes 16 %    % Monocytes 9 %    Mids % (Monos, Eos, Basos)      % Eosinophils 2 %    % Basophils 1 %    % Immature Granulocytes 1 %    NRBCs per 100 WBC 0 <1 /100    Absolute Neutrophils 6.2 1.6 - 8.3 10e3/uL    Absolute Lymphocytes 1.4 0.8 - 5.3 10e3/uL    Absolute Monocytes 0.8 0.0 - 1.3 10e3/uL    Mids Abs (Monos, Eos, Basos)      Absolute Eosinophils 0.1 0.0 - 0.7 10e3/uL    Absolute Basophils 0.0 0.0 - 0.2 10e3/uL    Absolute Immature Granulocytes 0.1 <=0.4 10e3/uL    Absolute NRBCs 0.0 10e3/uL   Extra Tube    Narrative    The following orders were created for panel order Extra Tube.  Procedure                               Abnormality         Status                     ---------                               -----------         ------                     Extra Red Top Tube[[id number]]                               Final result                 Please view results for these tests on the individual orders.   Extra Red Top Tube   Result Value Ref Range    Hold Specimen JIC        Medications -  No data to display    Assessments & Plan (with Medical Decision Making)     I have reviewed the nursing notes.    I have reviewed the findings, diagnosis, plan and need for follow up with the patient.  (B08.4) Hand, foot and mouth disease    (I89.1) Lymphangitis of back  Comment: 68 year old male who is accompanied per his wife.  He presents with a 1 week history of chills, fatigue, fever, mouth sores, sinus pain and pressure, body aches, and a headache.  Has taken Advil and Tylenol this morning that does help with the discomfort.  Exposed to COVID 2 weeks ago.  Had a negative home COVID test today.  Otherwise, no known sick contacts.  No recent vaccines; has had initial 2 COVID vaccines.  Smokes cigars; does not inhale.  Denies nausea, vomiting, diarrhea, and shortness of breath.  Denies tick bites.     MDM:NHT. Lungs CTA  Multiple macular/papular, erythematous rash on back.  Right lateral chest/back has 2 elongated vertical strips of mild erythema.  See media    CBC negative with the exception of slightly low hemoglobin 13.1; hematocrit 39  CMP  negative with the exception of mild hyponatremia 134; glucose 110; alkaline phosphatase 231    Plan: Cephalexin 4 times daily for 5 days and nystatin swish and spit.  Education provided and/or discussed for this/these medications, lymphangitis, and hand/foot/mouth disease.  -Increase fluids.   -Complete all antibiotics even if feeling better.    -Taking antibiotics with food may decrease the symptoms, of an upset stomach, that can occur when taking antibiotics. Antibiotics frequently cause diarrhea. Probiotics or yogurt may help prevent or decrease these symptoms.   -Return to be reevaluated if symptoms do not improve, or worsen.    Nystatin swish and spit 4 times daily.  Continue to use medication for 2 days after  your mouth sores resolved.    Follow-up with primary care provider in [facility].  These discharge instructions and medications were reviewed with him  and his wife and understanding verbalized.    This document was prepared using a combination of typing and voice generated software.  While every attempt was made for accuracy, spelling and grammatical errors may exist.    Discharge Medication List as of 10/11/2023  2:44 PM        START taking these medications    Details   cephALEXin (KEFLEX) 500 MG capsule Take 1 capsule (500 mg) by mouth 4 times daily for 5 days, Disp-20 capsule, R-0, E-Prescribe      nystatin (MYCOSTATIN) [id number] UNIT/ML suspension Take 5 mLs (500,000 Units) by mouth 4 times daily for 14 daysDisp-280 mL, [id number]             Final diagnoses:   Hand, foot and mouth disease   Lymphangitis of back       10/11/2023   [facility]         Maria E Tao, CNP  10/11/23 [id number]

## 2023-10-11 NOTE — TELEPHONE ENCOUNTER
Patient is out of town and has been sick for 6 days with body aches, cold sores, chills and temperature.  Tested negative for Covid and asking for a Z=pack to be sent in     [address] [facility]  [address]    Please reach out to patient   Reza  [phone]

## 2023-10-11 NOTE — ED TRIAGE NOTES
Patient presents to urgent care with wife for not feeling well. He describes it as having a fever, hot/cold  flashes, body aches, spots on back, tongue looks bad, cold sores on lip. These symptoms started last Thursday.  Patient took home COVID test which was negative.

## 2023-10-11 NOTE — TELEPHONE ENCOUNTER
Called pt and advised Dr. Salo Reyes needs to see him for appointment prior to prescribing antibiotics or he may go to urgent care for evaluation and treatment since [place]. Pt agrees to plan.  Ronald

## 2023-10-11 NOTE — DISCHARGE INSTRUCTIONS
-Increase fluids.   -Complete all antibiotics even if feeling better.    -Taking antibiotics with food may decrease the symptoms, of an upset stomach, that can occur when taking antibiotics. Antibiotics frequently cause diarrhea. Probiotics or yogurt may help prevent or decrease these symptoms.   -Return to be reevaluated if symptoms do not improve, or worsen.    Nystatin swish and spit 4 times daily.  Continue to use medication for 2 days after  your mouth sores resolved.    Follow-up with primary care provider

## 2023-10-13 ENCOUNTER — HOSPITAL ENCOUNTER (EMERGENCY)
Facility: HOSPITAL | Age: 68
Discharge: HOME | End: 2023-10-13
Attending: NURSE PRACTITIONER | Admitting: NURSE PRACTITIONER
Payer: MEDICARE

## 2023-10-13 ENCOUNTER — APPOINTMENT (OUTPATIENT)
Dept: GENERAL RADIOLOGY | Facility: HOSPITAL | Age: 68
End: 2023-10-13
Attending: NURSE PRACTITIONER
Payer: MEDICARE

## 2023-10-13 VITALS
OXYGEN SATURATION: 97 % | HEART RATE: 82 BPM | DIASTOLIC BLOOD PRESSURE: 68 MMHG | RESPIRATION RATE: 18 BRPM | TEMPERATURE: 97.5 F | SYSTOLIC BLOOD PRESSURE: 126 MMHG | HEIGHT: 76 IN | WEIGHT: 210.1 LBS | BODY MASS INDEX: 25.58 KG/M2

## 2023-10-13 DIAGNOSIS — R52 GENERALIZED BODY ACHES: Primary | ICD-10-CM

## 2023-10-13 DIAGNOSIS — R21 RASH: ICD-10-CM

## 2023-10-13 DIAGNOSIS — R05.1 ACUTE COUGH: ICD-10-CM

## 2023-10-13 DIAGNOSIS — B37.0 THRUSH: ICD-10-CM

## 2023-10-13 DIAGNOSIS — R63.4 WEIGHT LOSS: ICD-10-CM

## 2023-10-13 LAB
ALBUMIN SERPL BCG-MCNC: 3.5 G/DL (ref 3.5–5.2)
ALBUMIN UR-MCNC: 30 MG/DL
ALP SERPL-CCNC: 216 U/L (ref 40–129)
ALT SERPL W P-5'-P-CCNC: 45 U/L (ref 0–70)
ANION GAP SERPL CALCULATED.3IONS-SCNC: 10 MMOL/L (ref 7–15)
APPEARANCE UR: CLEAR
AST SERPL W P-5'-P-CCNC: 47 U/L (ref 0–45)
BASO+EOS+MONOS # BLD AUTO: ABNORMAL 10*3/UL
BASO+EOS+MONOS NFR BLD AUTO: ABNORMAL %
BASOPHILS # BLD AUTO: 0 10E3/UL (ref 0–0.2)
BASOPHILS NFR BLD AUTO: 1 %
BILIRUB SERPL-MCNC: 0.6 MG/DL
BILIRUB UR QL STRIP: NEGATIVE
BUN SERPL-MCNC: 14.5 MG/DL (ref 8–23)
CALCIUM SERPL-MCNC: 9.6 MG/DL (ref 8.8–10.2)
CHLORIDE SERPL-SCNC: 96 MMOL/L (ref 98–107)
COLOR UR AUTO: YELLOW
CREAT SERPL-MCNC: 0.76 MG/DL (ref 0.67–1.17)
CRP SERPL-MCNC: 160.68 MG/L
DEPRECATED HCO3 PLAS-SCNC: 27 MMOL/L (ref 22–29)
EGFRCR SERPLBLD CKD-EPI 2021: >90 ML/MIN/1.73M2
EOSINOPHIL # BLD AUTO: 0.1 10E3/UL (ref 0–0.7)
EOSINOPHIL NFR BLD AUTO: 1 %
ERYTHROCYTE [DISTWIDTH] IN BLOOD BY AUTOMATED COUNT: 12.9 % (ref 10–15)
GLUCOSE SERPL-MCNC: 113 MG/DL (ref 70–99)
GLUCOSE UR STRIP-MCNC: NEGATIVE MG/DL
HCT VFR BLD AUTO: 39.3 % (ref 40–53)
HGB BLD-MCNC: 13.3 G/DL (ref 13.3–17.7)
HGB UR QL STRIP: NEGATIVE
HOLD SPECIMEN: NORMAL
HYALINE CASTS: 3 /LPF
IMM GRANULOCYTES # BLD: 0.1 10E3/UL
IMM GRANULOCYTES NFR BLD: 1 %
KETONES UR STRIP-MCNC: NEGATIVE MG/DL
LEUKOCYTE ESTERASE UR QL STRIP: ABNORMAL
LYMPHOCYTES # BLD AUTO: 1.2 10E3/UL (ref 0.8–5.3)
LYMPHOCYTES NFR BLD AUTO: 15 %
MCH RBC QN AUTO: 29.6 PG (ref 26.5–33)
MCHC RBC AUTO-ENTMCNC: 33.8 G/DL (ref 31.5–36.5)
MCV RBC AUTO: 87 FL (ref 78–100)
MONOCYTES # BLD AUTO: 0.7 10E3/UL (ref 0–1.3)
MONOCYTES NFR BLD AUTO: 8 %
MUCOUS THREADS #/AREA URNS LPF: PRESENT /LPF
NEUTROPHILS # BLD AUTO: 6.1 10E3/UL (ref 1.6–8.3)
NEUTROPHILS NFR BLD AUTO: 74 %
NITRATE UR QL: NEGATIVE
NRBC # BLD AUTO: 0 10E3/UL
NRBC BLD AUTO-RTO: 0 /100
PH UR STRIP: 5.5 [PH] (ref 4.7–8)
PLATELET # BLD AUTO: 299 10E3/UL (ref 150–450)
POTASSIUM SERPL-SCNC: 4.3 MMOL/L (ref 3.4–5.3)
PROT SERPL-MCNC: 7.9 G/DL (ref 6.4–8.3)
RBC # BLD AUTO: 4.5 10E6/UL (ref 4.4–5.9)
RBC URINE: <1 /HPF
SODIUM SERPL-SCNC: 133 MMOL/L (ref 135–145)
SP GR UR STRIP: 1.03 (ref 1–1.03)
SQUAMOUS EPITHELIAL: 0 /HPF
TSH SERPL DL<=0.005 MIU/L-ACNC: 2.41 UIU/ML (ref 0.3–4.2)
UROBILINOGEN UR STRIP-MCNC: 2 MG/DL
WBC # BLD AUTO: 8.3 10E3/UL (ref 4–11)
WBC URINE: 2 /HPF

## 2023-10-13 PROCEDURE — 85025 COMPLETE CBC W/AUTO DIFF WBC: CPT | Performed by: NURSE PRACTITIONER

## 2023-10-13 PROCEDURE — 99213 OFFICE O/P EST LOW 20 MIN: CPT | Performed by: NURSE PRACTITIONER

## 2023-10-13 PROCEDURE — 87015 SPECIMEN INFECT AGNT CONCNTJ: CPT | Performed by: NURSE PRACTITIONER

## 2023-10-13 PROCEDURE — 84443 ASSAY THYROID STIM HORMONE: CPT | Performed by: NURSE PRACTITIONER

## 2023-10-13 PROCEDURE — 86140 C-REACTIVE PROTEIN: CPT | Performed by: NURSE PRACTITIONER

## 2023-10-13 PROCEDURE — 86618 LYME DISEASE ANTIBODY: CPT | Performed by: NURSE PRACTITIONER

## 2023-10-13 PROCEDURE — 87469 BABESIA MICROTI AMP PRB: CPT | Performed by: NURSE PRACTITIONER

## 2023-10-13 PROCEDURE — 36415 COLL VENOUS BLD VENIPUNCTURE: CPT | Performed by: NURSE PRACTITIONER

## 2023-10-13 PROCEDURE — 87468 ANAPLSMA PHGCYTOPHLM AMP PRB: CPT | Performed by: NURSE PRACTITIONER

## 2023-10-13 PROCEDURE — G0463 HOSPITAL OUTPT CLINIC VISIT: HCPCS | Mod: 25

## 2023-10-13 PROCEDURE — 80053 COMPREHEN METABOLIC PANEL: CPT | Performed by: NURSE PRACTITIONER

## 2023-10-13 PROCEDURE — 87449 NOS EACH ORGANISM AG IA: CPT | Mod: XU | Performed by: NURSE PRACTITIONER

## 2023-10-13 PROCEDURE — 71046 X-RAY EXAM CHEST 2 VIEWS: CPT

## 2023-10-13 PROCEDURE — 81001 URINALYSIS AUTO W/SCOPE: CPT | Performed by: NURSE PRACTITIONER

## 2023-10-13 RX ORDER — ROSUVASTATIN CALCIUM 5 MG/1
5 TABLET, COATED ORAL WEEKLY
COMMUNITY

## 2023-10-13 RX ORDER — ALBUTEROL SULFATE 90 UG/1
2 AEROSOL, METERED RESPIRATORY (INHALATION) EVERY 6 HOURS PRN
COMMUNITY

## 2023-10-13 ASSESSMENT — ACTIVITIES OF DAILY LIVING (ADL): ADLS_ACUITY_SCORE: 35

## 2023-10-13 ASSESSMENT — ENCOUNTER SYMPTOMS
VOMITING: 0
TREMORS: 0
SINUS PRESSURE: 0
HEMATURIA: 0
NAUSEA: 0
SORE THROAT: 0
ABDOMINAL PAIN: 0
TROUBLE SWALLOWING: 0
DYSURIA: 0
MYALGIAS: 1
DIZZINESS: 1
DIAPHORESIS: 1
WEAKNESS: 0
SHORTNESS OF BREATH: 1
DIARRHEA: 0
SINUS PAIN: 0
COUGH: 1
FEVER: 0
UNEXPECTED WEIGHT CHANGE: 1

## 2023-10-13 NOTE — DISCHARGE INSTRUCTIONS
The antigen test and tickborne illness tests are still pending.  We will notify you of these results when they are available.  Your chest x-ray does not show any concerning signs of pneumonia at this time.  Your thyroid function is normal.    Take Tylenol or ibuprofen as needed for pain.  Continue use of your inhalers.    Return to urgent care or emergency department for any worsening or concerning symptoms.

## 2023-10-13 NOTE — ED PROVIDER NOTES
History     Chief Complaint   Patient presents with    Cough     Cough and body aches     HPI  Rogelio Garcia is a 68 year old male who presents ambulatory to urgent care with his wife with concerns of persistent infection.  Patient reports night sweats, cough, shortness of breath, body aches, rash, dizziness, fevers and weight loss (20 pounds in 2 weeks).  Symptoms initially started 9 days ago.  Fever has since resolved.  Cough and shortness of breath started about 2 days ago.  Patient was seen in the urgent care about 2 days ago and was diagnosed with hand-foot-and-mouth disease as well as lymphangitis of his back and is currently being treated with cephalexin and nystatin mouthwash.  He notes that the pain in his mouth has gotten better.  The red streaks on his back are also improving.  Rest of the symptoms are persisting.  Patient is in this area visiting from the [facility] and will be going back home next month.    He tells me that he has been in the woods a lot and is concerned that he may have inhaled fungal spores and has developed blastomycosis which prompted today's visit.  He denies any known tick bites.    Past medical history most significant for hyperlipidemia and asthma.    No current tobacco use.  Denies any cardiac history.    No chest pain, abdominal pain, nausea, vomiting or diarrhea.  No dysuria or hematuria but notes that his urine is very dark.      Allergies:  No Known Allergies    Problem List:    There are no problems to display for this patient.       Past Medical History:    History reviewed. No pertinent past medical history.    Past Surgical History:    History reviewed. No pertinent surgical history.    Family History:    History reviewed. No pertinent family history.    Social History:  Marital Status:   [2]        Medications:    albuterol (PROAIR HFA/PROVENTIL HFA/VENTOLIN HFA) 108 (90 Base) MCG/ACT inhaler  cephALEXin (KEFLEX) 500 MG capsule  nystatin  "(MYCOSTATIN) 677898 UNIT/ML suspension  rosuvastatin (CRESTOR) 5 MG tablet          Review of Systems   Constitutional:  Positive for diaphoresis and unexpected weight change. Negative for fever (resolved).   HENT:  Negative for congestion, ear pain, sinus pressure, sinus pain, sore throat and trouble swallowing.    Respiratory:  Positive for cough and shortness of breath.    Cardiovascular:  Negative for chest pain.   Gastrointestinal:  Negative for abdominal pain, diarrhea, nausea and vomiting.   Genitourinary:  Negative for dysuria and hematuria.   Musculoskeletal:  Positive for myalgias.   Skin:  Positive for rash.   Neurological:  Positive for dizziness. Negative for tremors and weakness.   All other systems reviewed and are negative.      Physical Exam   BP: 126/68  Pulse: 82  Temp: 97.5  F (36.4  C)  Resp: 18  Height: 193 cm (6' 4\")  Weight: 95.3 kg (210 lb 1.6 oz)  SpO2: 97 %      Physical Exam  Vitals and nursing note reviewed.   Constitutional:       General: He is not in acute distress.     Appearance: Normal appearance. He is well-developed. He is not ill-appearing, toxic-appearing or diaphoretic.   HENT:      Head: Normocephalic and atraumatic.      Right Ear: Tympanic membrane and ear canal normal.      Left Ear: Tympanic membrane and ear canal normal.      Nose: Nose normal.      Mouth/Throat:      Mouth: Mucous membranes are moist.   Eyes:      Extraocular Movements: Extraocular movements intact.      Pupils: Pupils are equal, round, and reactive to light.   Cardiovascular:      Rate and Rhythm: Normal rate and regular rhythm.      Heart sounds: Normal heart sounds.   Pulmonary:      Effort: Pulmonary effort is normal. No respiratory distress.      Breath sounds: Normal breath sounds. No wheezing or rhonchi.   Musculoskeletal:         General: Normal range of motion.      Cervical back: Normal range of motion and neck supple.   Skin:     General: Skin is warm and dry.      Findings: Rash present. No " bruising or erythema.   Neurological:      General: No focal deficit present.      Mental Status: He is alert and oriented to person, place, and time.      Motor: No weakness.      Coordination: Coordination normal.      Gait: Gait normal.   Psychiatric:         Mood and Affect: Mood normal.         ED Course   Differential diagnosis not limited to: Tick borne illness, hand-foot-and-mouth disease, pneumonia, blastomycosis, hyper thyroidism, viral illness.              Procedures              Results for orders placed or performed during the hospital encounter of 10/13/23 (from the past 24 hour(s))   UA with Microscopic reflex to Culture    Specimen: Urine, Midstream   Result Value Ref Range    Color Urine Yellow Colorless, Straw, Light Yellow, Yellow    Appearance Urine Clear Clear    Glucose Urine Negative Negative mg/dL    Bilirubin Urine Negative Negative    Ketones Urine Negative Negative mg/dL    Specific Gravity Urine 1.029 1.003 - 1.035    Blood Urine Negative Negative    pH Urine 5.5 4.7 - 8.0    Protein Albumin Urine 30 (A) Negative mg/dL    Urobilinogen Urine 2.0 Normal, 2.0 mg/dL    Nitrite Urine Negative Negative    Leukocyte Esterase Urine Small (A) Negative    Mucus Urine Present (A) None Seen /LPF    RBC Urine <1 <=2 /HPF    WBC Urine 2 <=5 /HPF    Squamous Epithelials Urine 0 <=1 /HPF    Hyaline Casts Urine 3 (H) <=2 /LPF    Narrative    Urine Culture not indicated   CBC with platelets differential    Narrative    The following orders were created for panel order CBC with platelets differential.  Procedure                               Abnormality         Status                     ---------                               -----------         ------                     CBC with platelets and d...[[id number]]  Abnormal            Final result                 Please view results for these tests on the individual orders.   Comprehensive metabolic panel   Result Value Ref Range    Sodium 133 (L) 135 - 145  mmol/L    Potassium 4.3 3.4 - 5.3 mmol/L    Carbon Dioxide (CO2) 27 22 - 29 mmol/L    Anion Gap 10 7 - 15 mmol/L    Urea Nitrogen 14.5 8.0 - 23.0 mg/dL    Creatinine 0.76 0.67 - 1.17 mg/dL    GFR Estimate >90 >60 mL/min/1.73m2    Calcium 9.6 8.8 - 10.2 mg/dL    Chloride 96 (L) 98 - 107 mmol/L    Glucose 113 (H) 70 - 99 mg/dL    Alkaline Phosphatase 216 (H) 40 - 129 U/L    AST 47 (H) 0 - 45 U/L    ALT 45 0 - 70 U/L    Protein Total 7.9 6.4 - 8.3 g/dL    Albumin 3.5 3.5 - 5.2 g/dL    Bilirubin Total 0.6 <=1.2 mg/dL   CRP inflammation   Result Value Ref Range    CRP Inflammation 160.68 (H) <5.00 mg/L   TSH with free T4 reflex   Result Value Ref Range    TSH 2.41 0.30 - 4.20 uIU/mL   CBC with platelets and differential   Result Value Ref Range    WBC Count 8.3 4.0 - 11.0 10e3/uL    RBC Count 4.50 4.40 - 5.90 10e6/uL    Hemoglobin 13.3 13.3 - 17.7 g/dL    Hematocrit 39.3 (L) 40.0 - 53.0 %    MCV 87 78 - 100 fL    MCH 29.6 26.5 - 33.0 pg    MCHC 33.8 31.5 - 36.5 g/dL    RDW 12.9 10.0 - 15.0 %    Platelet Count 299 150 - 450 10e3/uL    % Neutrophils 74 %    % Lymphocytes 15 %    % Monocytes 8 %    Mids % (Monos, Eos, Basos)      % Eosinophils 1 %    % Basophils 1 %    % Immature Granulocytes 1 %    NRBCs per 100 WBC 0 <1 /100    Absolute Neutrophils 6.1 1.6 - 8.3 10e3/uL    Absolute Lymphocytes 1.2 0.8 - 5.3 10e3/uL    Absolute Monocytes 0.7 0.0 - 1.3 10e3/uL    Mids Abs (Monos, Eos, Basos)      Absolute Eosinophils 0.1 0.0 - 0.7 10e3/uL    Absolute Basophils 0.0 0.0 - 0.2 10e3/uL    Absolute Immature Granulocytes 0.1 <=0.4 10e3/uL    Absolute NRBCs 0.0 10e3/uL   XR Chest 2 Views    Narrative    XR CHEST 2 VIEWS    HISTORY: 68 years Male sob, cough x 10 days.    COMPARISON: None    TECHNIQUE: 2 views of the chest were obtained.    FINDINGS: Two views of the chest were obtained. Heart size and  pulmonary vascularity are within normal limits, lungs are clear on  both views. No consolidating air space opacities are  present.          Impression    IMPRESSION: Clear chest.    MELVIN RUTLEDGE MD         SYSTEM ID:  [id number]       Medications - No data to display    Assessments & Plan (with Medical Decision Making)  This is a well-appearing and pleasant 68-year-old male that presented with concerns of blastomycosis after he developed concerning symptoms that started 9 days ago.  Patient believes that he did inhale some fungal spores while he was out in the woods prior to symptom onset.  Recently evaluated in the urgent care and started on cephalexin and nystatin for lymphangitis of his back and oral candidiasis.  Denies any known tick bites.  Differentials included but not limited to tickborne illnesses, blastomycosis, pneumonia, hyperthyroidism, viral illness.  His vital signs are within normal limits.  His lab findings are without leukocytosis or anemia.  No electrolyte abnormalities.  Normal kidney function.  Very mildly elevated AST.  Alk phos is trending down in comparison to 2 days ago.  Normal TSH.  Urinalysis negative for infection.  His CRP was noted to be significantly elevated at 160.68.  Tickborne panel and Blastomyces antigen results are  pending  As these are send out labs.  His chest x-ray was negative for any acute findings.  I discussed all findings with patient noting a broad diagnosis.  Patient was noted to have thrush and is currently taking nystatin noting his symptoms are improving.  He did have some red streaks to his upper back which are gradually improving since he started cephalexin.  He does have a nonspecific rash to his back which could be consistent with his recent diagnosis of HFM 2 days ago.  At this time patient feels the symptoms are somewhat improving.  He was advised to continue finishing of the cephalexin and nystatin.  He will be notified of pending results when they are available and any new treatment plan will be discussed at that time.  Tylenol or ibuprofen can be taken for pain. Patient  does not plan on returning back home to the [facility] until next month.  He was advised that if his symptoms worsen or he develops any other concerning symptoms he are to return back to urgent care/emergency department for evaluation.       I have reviewed the nursing notes.    I have reviewed the findings, diagnosis, plan and need for follow up with the patient.    Medical Decision Making  The patient's presentation was of moderate complexity (an acute illness with systemic symptoms).    The patient's evaluation involved:  review of external note(s) from 1 sources (urgent care visit on 10/11/2023)  review of 2 test result(s) ordered prior to this encounter (CBC and CMP)  ordering and/or review of 3+ test(s) in this encounter (see separate area of note for details)    The patient's management necessitated only low risk treatment.    This document was prepared using a combination of typing and voice generated software.  While every attempt was made for accuracy, spelling and grammatical errors may exist.     New Prescriptions    No medications on file       Final diagnoses:   Generalized body aches   Weight loss   Acute cough   Thrush   Rash       10/13/2023   [facility]       Mpofu, Prudence, CNP  10/13/23 1700

## 2023-10-16 LAB
A PHAGOCYTOPH DNA BLD QL NAA+PROBE: NOT DETECTED
ANAPLASMA BLD MOD GIEMSA: NEGATIVE
B BURGDOR IGG+IGM SER QL: 0.19
B MICROTI BLD SMEAR: NEGATIVE
B MICROTI DNA BLD QL NAA+PROBE: NOT DETECTED
BABESIA DNA BLD QL NAA+PROBE: NOT DETECTED
E CHAFFEENSIS DNA BLD QL NAA+PROBE: NOT DETECTED
E EWINGII DNA SPEC QL NAA+PROBE: NOT DETECTED
EHRLICHIA DNA SPEC QL NAA+PROBE: NOT DETECTED
EHRLICHIA SPEC QL MICRO: NEGATIVE

## 2023-10-18 LAB — SCANNED LAB RESULT: NORMAL

## 2023-10-19 ENCOUNTER — APPOINTMENT (OUTPATIENT)
Dept: CT IMAGING | Facility: HOSPITAL | Age: 68
End: 2023-10-19
Attending: EMERGENCY MEDICINE
Payer: MEDICARE

## 2023-10-19 ENCOUNTER — HOSPITAL ENCOUNTER (EMERGENCY)
Facility: HOSPITAL | Age: 68
Discharge: HOME | End: 2023-10-19
Attending: EMERGENCY MEDICINE | Admitting: EMERGENCY MEDICINE
Payer: MEDICARE

## 2023-10-19 VITALS
HEART RATE: 79 BPM | DIASTOLIC BLOOD PRESSURE: 100 MMHG | TEMPERATURE: 99.4 F | WEIGHT: 202 LBS | RESPIRATION RATE: 18 BRPM | SYSTOLIC BLOOD PRESSURE: 120 MMHG | BODY MASS INDEX: 24.59 KG/M2 | OXYGEN SATURATION: 94 %

## 2023-10-19 DIAGNOSIS — R16.0 LIVER MASS: Primary | ICD-10-CM

## 2023-10-19 DIAGNOSIS — J20.9 ACUTE BRONCHITIS, UNSPECIFIED ORGANISM: ICD-10-CM

## 2023-10-19 LAB
ALBUMIN SERPL BCG-MCNC: 3.4 G/DL (ref 3.5–5.2)
ALBUMIN UR-MCNC: 10 MG/DL
ALP SERPL-CCNC: 185 U/L (ref 40–129)
ALT SERPL W P-5'-P-CCNC: 33 U/L (ref 0–70)
ANION GAP SERPL CALCULATED.3IONS-SCNC: 11 MMOL/L (ref 7–15)
APPEARANCE UR: CLEAR
AST SERPL W P-5'-P-CCNC: 40 U/L (ref 0–45)
BASO+EOS+MONOS # BLD AUTO: ABNORMAL 10*3/UL
BASO+EOS+MONOS NFR BLD AUTO: ABNORMAL %
BASOPHILS # BLD AUTO: 0 10E3/UL (ref 0–0.2)
BASOPHILS NFR BLD AUTO: 0 %
BILIRUB SERPL-MCNC: 0.6 MG/DL
BILIRUB UR QL STRIP: NEGATIVE
BUN SERPL-MCNC: 12.4 MG/DL (ref 8–23)
CALCIUM SERPL-MCNC: 9.3 MG/DL (ref 8.8–10.2)
CHLORIDE SERPL-SCNC: 94 MMOL/L (ref 98–107)
COLOR UR AUTO: YELLOW
CREAT SERPL-MCNC: 0.73 MG/DL (ref 0.67–1.17)
CRP SERPL-MCNC: 131.65 MG/L
DEPRECATED HCO3 PLAS-SCNC: 26 MMOL/L (ref 22–29)
EGFRCR SERPLBLD CKD-EPI 2021: >90 ML/MIN/1.73M2
EOSINOPHIL # BLD AUTO: 0.1 10E3/UL (ref 0–0.7)
EOSINOPHIL NFR BLD AUTO: 1 %
ERYTHROCYTE [DISTWIDTH] IN BLOOD BY AUTOMATED COUNT: 12.6 % (ref 10–15)
ERYTHROCYTE [SEDIMENTATION RATE] IN BLOOD BY WESTERGREN METHOD: 93 MM/HR (ref 0–20)
FLUAV RNA SPEC QL NAA+PROBE: NEGATIVE
FLUBV RNA RESP QL NAA+PROBE: NEGATIVE
GLUCOSE SERPL-MCNC: 99 MG/DL (ref 70–99)
GLUCOSE UR STRIP-MCNC: NEGATIVE MG/DL
HCT VFR BLD AUTO: 35.9 % (ref 40–53)
HGB BLD-MCNC: 12.1 G/DL (ref 13.3–17.7)
HGB UR QL STRIP: NEGATIVE
HOLD SPECIMEN: NORMAL
IMM GRANULOCYTES # BLD: 0.1 10E3/UL
IMM GRANULOCYTES NFR BLD: 1 %
INR PPP: 1.17 (ref 0.85–1.15)
KETONES UR STRIP-MCNC: NEGATIVE MG/DL
LACTATE SERPL-SCNC: 1.2 MMOL/L (ref 0.7–2)
LEUKOCYTE ESTERASE UR QL STRIP: NEGATIVE
LIPASE SERPL-CCNC: 23 U/L (ref 13–60)
LYMPHOCYTES # BLD AUTO: 1.1 10E3/UL (ref 0.8–5.3)
LYMPHOCYTES NFR BLD AUTO: 11 %
MAGNESIUM SERPL-MCNC: 2.2 MG/DL (ref 1.7–2.3)
MCH RBC QN AUTO: 29.7 PG (ref 26.5–33)
MCHC RBC AUTO-ENTMCNC: 33.7 G/DL (ref 31.5–36.5)
MCV RBC AUTO: 88 FL (ref 78–100)
MONOCYTES # BLD AUTO: 0.7 10E3/UL (ref 0–1.3)
MONOCYTES NFR BLD AUTO: 6 %
MUCOUS THREADS #/AREA URNS LPF: PRESENT /LPF
NEUTROPHILS # BLD AUTO: 8.8 10E3/UL (ref 1.6–8.3)
NEUTROPHILS NFR BLD AUTO: 81 %
NITRATE UR QL: NEGATIVE
NRBC # BLD AUTO: 0 10E3/UL
NRBC BLD AUTO-RTO: 0 /100
PH UR STRIP: 5.5 [PH] (ref 4.7–8)
PLATELET # BLD AUTO: 409 10E3/UL (ref 150–450)
POTASSIUM SERPL-SCNC: 4.5 MMOL/L (ref 3.4–5.3)
PROCALCITONIN SERPL IA-MCNC: 0.36 NG/ML
PROT SERPL-MCNC: 7.6 G/DL (ref 6.4–8.3)
RBC # BLD AUTO: 4.08 10E6/UL (ref 4.4–5.9)
RBC URINE: 0 /HPF
RSV RNA SPEC NAA+PROBE: NEGATIVE
SARS-COV-2 RNA RESP QL NAA+PROBE: NEGATIVE
SODIUM SERPL-SCNC: 131 MMOL/L (ref 135–145)
SP GR UR STRIP: 1.02 (ref 1–1.03)
SQUAMOUS EPITHELIAL: 0 /HPF
UROBILINOGEN UR STRIP-MCNC: NORMAL MG/DL
WBC # BLD AUTO: 10.8 10E3/UL (ref 4–11)
WBC URINE: 2 /HPF

## 2023-10-19 PROCEDURE — 85652 RBC SED RATE AUTOMATED: CPT | Performed by: EMERGENCY MEDICINE

## 2023-10-19 PROCEDURE — 99284 EMERGENCY DEPT VISIT MOD MDM: CPT | Performed by: EMERGENCY MEDICINE

## 2023-10-19 PROCEDURE — 83690 ASSAY OF LIPASE: CPT | Performed by: EMERGENCY MEDICINE

## 2023-10-19 PROCEDURE — 86140 C-REACTIVE PROTEIN: CPT | Performed by: EMERGENCY MEDICINE

## 2023-10-19 PROCEDURE — 70450 CT HEAD/BRAIN W/O DYE: CPT | Mod: MF

## 2023-10-19 PROCEDURE — 87637 SARSCOV2&INF A&B&RSV AMP PRB: CPT | Performed by: EMERGENCY MEDICINE

## 2023-10-19 PROCEDURE — 36415 COLL VENOUS BLD VENIPUNCTURE: CPT | Performed by: EMERGENCY MEDICINE

## 2023-10-19 PROCEDURE — 84145 PROCALCITONIN (PCT): CPT | Performed by: EMERGENCY MEDICINE

## 2023-10-19 PROCEDURE — 86757 RICKETTSIA ANTIBODY: CPT | Performed by: EMERGENCY MEDICINE

## 2023-10-19 PROCEDURE — 83735 ASSAY OF MAGNESIUM: CPT | Performed by: EMERGENCY MEDICINE

## 2023-10-19 PROCEDURE — 99285 EMERGENCY DEPT VISIT HI MDM: CPT | Mod: 25

## 2023-10-19 PROCEDURE — 85610 PROTHROMBIN TIME: CPT | Performed by: EMERGENCY MEDICINE

## 2023-10-19 PROCEDURE — 250N000011 HC RX IP 250 OP 636: Performed by: EMERGENCY MEDICINE

## 2023-10-19 PROCEDURE — 81001 URINALYSIS AUTO W/SCOPE: CPT | Performed by: EMERGENCY MEDICINE

## 2023-10-19 PROCEDURE — C9803 HOPD COVID-19 SPEC COLLECT: HCPCS

## 2023-10-19 PROCEDURE — 83605 ASSAY OF LACTIC ACID: CPT | Performed by: EMERGENCY MEDICINE

## 2023-10-19 PROCEDURE — 85004 AUTOMATED DIFF WBC COUNT: CPT | Performed by: EMERGENCY MEDICINE

## 2023-10-19 PROCEDURE — 74177 CT ABD & PELVIS W/CONTRAST: CPT | Mod: MG

## 2023-10-19 PROCEDURE — 80053 COMPREHEN METABOLIC PANEL: CPT | Performed by: EMERGENCY MEDICINE

## 2023-10-19 RX ORDER — DOXYCYCLINE 100 MG/1
100 CAPSULE ORAL 2 TIMES DAILY
Qty: 28 CAPSULE | Refills: 0 | Status: SHIPPED | OUTPATIENT
Start: 2023-10-19 | End: 2023-11-02

## 2023-10-19 RX ORDER — KETOCONAZOLE 200 MG/1
200 TABLET ORAL DAILY
Qty: 14 TABLET | Refills: 0 | Status: SHIPPED | OUTPATIENT
Start: 2023-10-19 | End: 2023-11-02

## 2023-10-19 RX ORDER — IOPAMIDOL 755 MG/ML
123 INJECTION, SOLUTION INTRAVASCULAR ONCE
Status: COMPLETED | OUTPATIENT
Start: 2023-10-19 | End: 2023-10-19

## 2023-10-19 RX ADMIN — IOPAMIDOL 123 ML: 755 INJECTION, SOLUTION INTRAVENOUS at 16:07

## 2023-10-19 ASSESSMENT — ACTIVITIES OF DAILY LIVING (ADL)
ADLS_ACUITY_SCORE: 35
ADLS_ACUITY_SCORE: 35

## 2023-10-19 NOTE — ED PROVIDER NOTES
"  History     Chief Complaint   Patient presents with    Generalized Body Aches     HPI  Rogelio Garcia is a 68 year old male who states for the last several days he has been somewhat fatigued.  He states that he has \"brain pain\".  He states when he is experiencing is not related headache but he has some discomfort which she describes as \"brain pain\".  States he also has some intermittent \"disorientation\".  Patient's was seen in urgent care twice recently.  He had tickborne illness panel which was negative.  He was also treated for hand-foot-and-mouth disease.  Patient also reports a nonvoluntary 25 pound weight loss over the last several weeks.  States his appetite has not been quite as good as normal.  States he does have a mild nonproductive cough.  He relates he has asthma for which she uses his inhaler.  He has not been having pain in his chest.  He denies any abdominal discomfort.  He was also recently treated for thrush.  States he has not had diarrhea or constipation.  He has not had hematuria or dysuria.  The patient and his wife did relate to my nursing staff that they had traveled to [place] and [place] a few months ago.  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    Allergies:  No Known Allergies    Problem List:    There are no problems to display for this patient.       Past Medical History:    No past medical history on file.    Past Surgical History:    No past surgical history on file.    Family History:    No family history on file.    Social History:  Marital Status:   [2]  Social History     Tobacco Use    Smoking status: Never    Smokeless tobacco: Never   Substance Use Topics    Alcohol use: Yes     Comment: monthly    Drug use: Yes     Types: Marijuana     Comment: rare        Medications:    albuterol (PROAIR HFA/PROVENTIL HFA/VENTOLIN HFA) 108 (90 Base) MCG/ACT inhaler  fish oil-omega-3 fatty acids 1000 MG " capsule  multivitamin w/minerals (THERA-VIT-M) tablet  rosuvastatin (CRESTOR) 5 MG tablet  WIXELA INHUB 250-50 MCG/ACT inhaler          Review of Systems   Constitutional:  Positive for fatigue and unexpected weight change.   HENT: Negative.     Eyes: Negative.    Respiratory: Negative.     Cardiovascular: Negative.    Gastrointestinal: Negative.    Endocrine: Negative.    Genitourinary: Negative.    Musculoskeletal:         Please see history chief complaint   Neurological:         Please see history of chief complaint       Physical Exam   BP: 135/86  Pulse: 71  Temp: 99.4  F (37.4  C)  Resp: 16  Weight: 91.6 kg (202 lb)  SpO2: 97 %      Physical Exam 68-year-old gentleman who is awake alert oriented person place and time very pleasant and cooperative with my exam.  HEENT normocephalic extract muscles intact pupils equally round and active light and oropharynx is clear.  Neck is supple his range of motion without pain.  Lungs are clear bilaterally.  Heart maintains regular rate and rhythm S1-S2 sounds are appreciated.  The abdomen is soft nontender no rebound or involuntary guarding.  Extremities are full range of motion 5/5 strength plus peripheral pulses brisk capillary fill no sensory deficit.  Neurologic exam no facial asymmetry or focal cranial nerve deficit.  Dermatologic exam no diffuse skin rashes or lesions.    ED Course              ED Course as of 11/27/23 1248   Thu Oct 19, 2023   1718 discussed lab and CT findings with the patient.  We will schedule outpatient MRI for the patient.  Will prescribe medication for his cough.  I will advise him to follow-up with his primary care provider soon as possible next week                         No results found for this or any previous visit (from the past 24 hour(s)).      Medications   iopamidol (ISOVUE-370) solution 123 mL (123 mLs Intravenous $Given 10/19/23 1607)   sodium chloride (PF) 0.9% PF flush 60 mL (50 mLs Intravenous $Given 10/19/23 1607)        Assessments & Plan (with Medical Decision Making)     I have reviewed the nursing notes.    I have reviewed the findings, diagnosis, plan and need for follow up with the patient.          Medical Decision Making  The patient's presentation was of moderate complexity (an acute illness with systemic symptoms).    The patient's evaluation involved:  ordering and/or review of 3+ test(s) in this encounter (see separate area of note for details)    The patient's management necessitated moderate risk (prescription drug management including medications given in the ED).        Discharge Medication List as of 10/19/2023  5:29 PM        START taking these medications    Details   doxycycline hyclate (VIBRAMYCIN) 100 MG capsule Take 1 capsule (100 mg) by mouth 2 times daily for 14 days, Disp-28 capsule, R-0, E-Prescribe      ketoconazole (NIZORAL) 200 MG tablet Take 1 tablet (200 mg) by mouth daily for 14 days, Disp-14 tablet, R-0, E-Prescribe             Final diagnoses:   Acute bronchitis, unspecified organism   Liver mass       10/19/2023   [facility]       Balaji Lima MD  10/19/23 4992       Balaji Lima MD  11/23/23 0717       Balaji Lima MD  11/27/23 1248       Balaji Lima MD  11/29/23 0755

## 2023-10-19 NOTE — ED TRIAGE NOTES
Reports seen a few times for different complaints but still not getting better. Reports head pressure/ pain sensation, fatigue and some disorientation that is intermittent. Was treated for HFM, and thrush. Checked for tick borne illness. Reports he has also lost 25 lbs.

## 2023-10-20 ENCOUNTER — APPOINTMENT (OUTPATIENT)
Dept: ULTRASOUND IMAGING | Facility: HOSPITAL | Age: 68
End: 2023-10-20
Attending: EMERGENCY MEDICINE
Payer: MEDICARE

## 2023-10-20 ENCOUNTER — HOSPITAL ENCOUNTER (EMERGENCY)
Facility: HOSPITAL | Age: 68
Discharge: ANOTHER HOSPITAL | End: 2023-10-20
Attending: EMERGENCY MEDICINE | Admitting: EMERGENCY MEDICINE
Payer: MEDICARE

## 2023-10-20 VITALS
DIASTOLIC BLOOD PRESSURE: 75 MMHG | OXYGEN SATURATION: 94 % | TEMPERATURE: 98.7 F | RESPIRATION RATE: 18 BRPM | BODY MASS INDEX: 25.06 KG/M2 | WEIGHT: 205.91 LBS | HEART RATE: 72 BPM | SYSTOLIC BLOOD PRESSURE: 124 MMHG

## 2023-10-20 DIAGNOSIS — K75.0 LIVER ABSCESS: ICD-10-CM

## 2023-10-20 LAB
ALBUMIN SERPL BCG-MCNC: 3.6 G/DL (ref 3.5–5.2)
ALBUMIN UR-MCNC: 50 MG/DL
ALP SERPL-CCNC: 209 U/L (ref 40–129)
ALT SERPL W P-5'-P-CCNC: 44 U/L (ref 0–70)
ANION GAP SERPL CALCULATED.3IONS-SCNC: 12 MMOL/L (ref 7–15)
APPEARANCE UR: ABNORMAL
AST SERPL W P-5'-P-CCNC: 58 U/L (ref 0–45)
BACTERIA #/AREA URNS HPF: ABNORMAL /HPF
BASE EXCESS BLDV CALC-SCNC: 2.7 MMOL/L (ref -7.7–1.9)
BASO+EOS+MONOS # BLD AUTO: ABNORMAL 10*3/UL
BASO+EOS+MONOS NFR BLD AUTO: ABNORMAL %
BASOPHILS # BLD AUTO: 0 10E3/UL (ref 0–0.2)
BASOPHILS NFR BLD AUTO: 0 %
BILIRUB SERPL-MCNC: 0.5 MG/DL
BILIRUB UR QL STRIP: NEGATIVE
BUN SERPL-MCNC: 15.9 MG/DL (ref 8–23)
CALCIUM SERPL-MCNC: 9.6 MG/DL (ref 8.8–10.2)
CHLORIDE SERPL-SCNC: 94 MMOL/L (ref 98–107)
COLOR UR AUTO: YELLOW
CREAT SERPL-MCNC: 0.85 MG/DL (ref 0.67–1.17)
CRP SERPL-MCNC: 194.01 MG/L
DEPRECATED HCO3 PLAS-SCNC: 27 MMOL/L (ref 22–29)
EGFRCR SERPLBLD CKD-EPI 2021: >90 ML/MIN/1.73M2
EOSINOPHIL # BLD AUTO: 0.1 10E3/UL (ref 0–0.7)
EOSINOPHIL NFR BLD AUTO: 1 %
ERYTHROCYTE [DISTWIDTH] IN BLOOD BY AUTOMATED COUNT: 12.7 % (ref 10–15)
ERYTHROCYTE [SEDIMENTATION RATE] IN BLOOD BY WESTERGREN METHOD: 90 MM/HR (ref 0–20)
ETHANOL SERPL-MCNC: <0.01 G/DL
FLUAV RNA SPEC QL NAA+PROBE: NEGATIVE
FLUBV RNA RESP QL NAA+PROBE: NEGATIVE
GLUCOSE SERPL-MCNC: 123 MG/DL (ref 70–99)
GLUCOSE UR STRIP-MCNC: NEGATIVE MG/DL
HCO3 BLDV-SCNC: 30 MMOL/L (ref 21–28)
HCT VFR BLD AUTO: 37.3 % (ref 40–53)
HGB BLD-MCNC: 12.2 G/DL (ref 13.3–17.7)
HGB UR QL STRIP: NEGATIVE
HOLD SPECIMEN: NORMAL
HYALINE CASTS: 9 /LPF
IMM GRANULOCYTES # BLD: 0.1 10E3/UL
IMM GRANULOCYTES NFR BLD: 0 %
INR PPP: 1.21 (ref 0.85–1.15)
KETONES UR STRIP-MCNC: ABNORMAL MG/DL
LACTATE SERPL-SCNC: 1.7 MMOL/L (ref 0.7–2)
LEUKOCYTE ESTERASE UR QL STRIP: NEGATIVE
LIPASE SERPL-CCNC: 25 U/L (ref 13–60)
LYMPHOCYTES # BLD AUTO: 1.2 10E3/UL (ref 0.8–5.3)
LYMPHOCYTES NFR BLD AUTO: 10 %
MCH RBC QN AUTO: 29 PG (ref 26.5–33)
MCHC RBC AUTO-ENTMCNC: 32.7 G/DL (ref 31.5–36.5)
MCV RBC AUTO: 89 FL (ref 78–100)
MONOCYTES # BLD AUTO: 0.8 10E3/UL (ref 0–1.3)
MONOCYTES NFR BLD AUTO: 7 %
MUCOUS THREADS #/AREA URNS LPF: PRESENT /LPF
NEUTROPHILS # BLD AUTO: 9.4 10E3/UL (ref 1.6–8.3)
NEUTROPHILS NFR BLD AUTO: 82 %
NITRATE UR QL: NEGATIVE
NRBC # BLD AUTO: 0 10E3/UL
NRBC BLD AUTO-RTO: 0 /100
NT-PROBNP SERPL-MCNC: 92 PG/ML (ref 0–900)
O2/TOTAL GAS SETTING VFR VENT: 0 %
OXYHGB MFR BLDV: 41 % (ref 70–75)
PCO2 BLDV: 55 MM HG (ref 40–50)
PH BLDV: 7.34 [PH] (ref 7.32–7.43)
PH UR STRIP: 5.5 [PH] (ref 4.7–8)
PLATELET # BLD AUTO: 449 10E3/UL (ref 150–450)
PO2 BLDV: 28 MM HG (ref 25–47)
POTASSIUM SERPL-SCNC: 4.5 MMOL/L (ref 3.4–5.3)
PROCALCITONIN SERPL IA-MCNC: 0.38 NG/ML
PROT SERPL-MCNC: 8 G/DL (ref 6.4–8.3)
RBC # BLD AUTO: 4.21 10E6/UL (ref 4.4–5.9)
RBC URINE: 0 /HPF
RSV RNA SPEC NAA+PROBE: NEGATIVE
SARS-COV-2 RNA RESP QL NAA+PROBE: NEGATIVE
SODIUM SERPL-SCNC: 133 MMOL/L (ref 135–145)
SP GR UR STRIP: 1.03 (ref 1–1.03)
SQUAMOUS EPITHELIAL: 0 /HPF
TROPONIN T SERPL HS-MCNC: 6 NG/L
UROBILINOGEN UR STRIP-MCNC: 3 MG/DL
WBC # BLD AUTO: 11.5 10E3/UL (ref 4–11)
WBC URINE: 4 /HPF

## 2023-10-20 PROCEDURE — 82805 BLOOD GASES W/O2 SATURATION: CPT | Performed by: EMERGENCY MEDICINE

## 2023-10-20 PROCEDURE — 36415 COLL VENOUS BLD VENIPUNCTURE: CPT | Performed by: EMERGENCY MEDICINE

## 2023-10-20 PROCEDURE — 87637 SARSCOV2&INF A&B&RSV AMP PRB: CPT | Performed by: EMERGENCY MEDICINE

## 2023-10-20 PROCEDURE — 250N000013 HC RX MED GY IP 250 OP 250 PS 637: Performed by: EMERGENCY MEDICINE

## 2023-10-20 PROCEDURE — 83690 ASSAY OF LIPASE: CPT | Performed by: EMERGENCY MEDICINE

## 2023-10-20 PROCEDURE — 87389 HIV-1 AG W/HIV-1&-2 AB AG IA: CPT | Mod: GZ | Performed by: EMERGENCY MEDICINE

## 2023-10-20 PROCEDURE — 84484 ASSAY OF TROPONIN QUANT: CPT | Performed by: EMERGENCY MEDICINE

## 2023-10-20 PROCEDURE — 81001 URINALYSIS AUTO W/SCOPE: CPT | Performed by: EMERGENCY MEDICINE

## 2023-10-20 PROCEDURE — 250N000011 HC RX IP 250 OP 636: Mod: JZ | Performed by: EMERGENCY MEDICINE

## 2023-10-20 PROCEDURE — 84145 PROCALCITONIN (PCT): CPT | Performed by: EMERGENCY MEDICINE

## 2023-10-20 PROCEDURE — 96361 HYDRATE IV INFUSION ADD-ON: CPT

## 2023-10-20 PROCEDURE — 82077 ASSAY SPEC XCP UR&BREATH IA: CPT | Performed by: EMERGENCY MEDICINE

## 2023-10-20 PROCEDURE — 86140 C-REACTIVE PROTEIN: CPT | Performed by: EMERGENCY MEDICINE

## 2023-10-20 PROCEDURE — 85014 HEMATOCRIT: CPT | Performed by: EMERGENCY MEDICINE

## 2023-10-20 PROCEDURE — 87086 URINE CULTURE/COLONY COUNT: CPT | Performed by: EMERGENCY MEDICINE

## 2023-10-20 PROCEDURE — 85610 PROTHROMBIN TIME: CPT | Performed by: EMERGENCY MEDICINE

## 2023-10-20 PROCEDURE — C9803 HOPD COVID-19 SPEC COLLECT: HCPCS

## 2023-10-20 PROCEDURE — 96375 TX/PRO/DX INJ NEW DRUG ADDON: CPT

## 2023-10-20 PROCEDURE — 80053 COMPREHEN METABOLIC PANEL: CPT | Performed by: EMERGENCY MEDICINE

## 2023-10-20 PROCEDURE — 76705 ECHO EXAM OF ABDOMEN: CPT

## 2023-10-20 PROCEDURE — 87040 BLOOD CULTURE FOR BACTERIA: CPT | Mod: XU | Performed by: EMERGENCY MEDICINE

## 2023-10-20 PROCEDURE — 83880 ASSAY OF NATRIURETIC PEPTIDE: CPT | Mod: GZ | Performed by: EMERGENCY MEDICINE

## 2023-10-20 PROCEDURE — 258N000003 HC RX IP 258 OP 636: Performed by: EMERGENCY MEDICINE

## 2023-10-20 PROCEDURE — 85652 RBC SED RATE AUTOMATED: CPT | Performed by: EMERGENCY MEDICINE

## 2023-10-20 PROCEDURE — 83605 ASSAY OF LACTIC ACID: CPT | Performed by: EMERGENCY MEDICINE

## 2023-10-20 PROCEDURE — 96365 THER/PROPH/DIAG IV INF INIT: CPT

## 2023-10-20 PROCEDURE — 99291 CRITICAL CARE FIRST HOUR: CPT | Mod: 25

## 2023-10-20 PROCEDURE — 99291 CRITICAL CARE FIRST HOUR: CPT | Performed by: EMERGENCY MEDICINE

## 2023-10-20 PROCEDURE — 87581 M.PNEUMON DNA AMP PROBE: CPT | Performed by: EMERGENCY MEDICINE

## 2023-10-20 RX ORDER — ACETAMINOPHEN 325 MG/1
975 TABLET ORAL ONCE
Status: COMPLETED | OUTPATIENT
Start: 2023-10-20 | End: 2023-10-20

## 2023-10-20 RX ORDER — MULTIPLE VITAMINS W/ MINERALS TAB 9MG-400MCG
1 TAB ORAL DAILY
COMMUNITY

## 2023-10-20 RX ORDER — METRONIDAZOLE 500 MG/100ML
500 INJECTION, SOLUTION INTRAVENOUS ONCE
Status: COMPLETED | OUTPATIENT
Start: 2023-10-20 | End: 2023-10-20

## 2023-10-20 RX ORDER — PIPERACILLIN SODIUM, TAZOBACTAM SODIUM 3; .375 G/15ML; G/15ML
3.38 INJECTION, POWDER, LYOPHILIZED, FOR SOLUTION INTRAVENOUS ONCE
Status: COMPLETED | OUTPATIENT
Start: 2023-10-20 | End: 2023-10-20

## 2023-10-20 RX ORDER — CHLORAL HYDRATE 500 MG
1 CAPSULE ORAL DAILY
COMMUNITY

## 2023-10-20 RX ORDER — FLUTICASONE PROPIONATE AND SALMETEROL 250; 50 UG/1; UG/1
1 POWDER RESPIRATORY (INHALATION)
COMMUNITY
Start: 2023-02-09

## 2023-10-20 RX ADMIN — PIPERACILLIN AND TAZOBACTAM 3.38 G: 3; .375 INJECTION, POWDER, FOR SOLUTION INTRAVENOUS at 21:48

## 2023-10-20 RX ADMIN — ACETAMINOPHEN 975 MG: 325 TABLET, FILM COATED ORAL at 20:05

## 2023-10-20 RX ADMIN — METRONIDAZOLE 500 MG: 500 INJECTION, SOLUTION INTRAVENOUS at 21:48

## 2023-10-20 RX ADMIN — SODIUM CHLORIDE 1000 ML: 9 INJECTION, SOLUTION INTRAVENOUS at 20:03

## 2023-10-20 ASSESSMENT — ACTIVITIES OF DAILY LIVING (ADL): ADLS_ACUITY_SCORE: 35

## 2023-10-20 NOTE — ED TRIAGE NOTES
Patient in today with complaints on sudden on set of right lower quadrant pain that he states started 1 hour ago. Patient is rating the pain at a 10/10.

## 2023-10-21 LAB
C PNEUM DNA SPEC QL NAA+PROBE: NOT DETECTED
FLUAV H1 2009 PAND RNA SPEC QL NAA+PROBE: NOT DETECTED
FLUAV H1 RNA SPEC QL NAA+PROBE: NOT DETECTED
FLUAV H3 RNA SPEC QL NAA+PROBE: NOT DETECTED
FLUAV RNA SPEC QL NAA+PROBE: NOT DETECTED
FLUBV RNA SPEC QL NAA+PROBE: NOT DETECTED
HADV DNA SPEC QL NAA+PROBE: NOT DETECTED
HCOV PNL SPEC NAA+PROBE: NOT DETECTED
HMPV RNA SPEC QL NAA+PROBE: NOT DETECTED
HPIV1 RNA SPEC QL NAA+PROBE: NOT DETECTED
HPIV2 RNA SPEC QL NAA+PROBE: NOT DETECTED
HPIV3 RNA SPEC QL NAA+PROBE: NOT DETECTED
HPIV4 RNA SPEC QL NAA+PROBE: NOT DETECTED
M PNEUMO DNA SPEC QL NAA+PROBE: NOT DETECTED
RSV RNA SPEC QL NAA+PROBE: NOT DETECTED
RSV RNA SPEC QL NAA+PROBE: NOT DETECTED
RV+EV RNA SPEC QL NAA+PROBE: NOT DETECTED

## 2023-10-22 LAB — BACTERIA UR CULT: NORMAL

## 2023-10-23 LAB
HIV 1+2 AB+HIV1 P24 AG SERPL QL IA: NONREACTIVE
R RICKETTSI IGG TITR SER IF: NORMAL {TITER}
R RICKETTSI IGM TITR SER IF: NORMAL {TITER}

## 2023-10-24 ASSESSMENT — ENCOUNTER SYMPTOMS
FEVER: 1
SHORTNESS OF BREATH: 0
CHILLS: 1

## 2023-10-24 NOTE — ED PROVIDER NOTES
History     Chief Complaint   Patient presents with    Abdominal Pain     HPI  Rogelio Garcia is a 68 year old male who is here with right sided abdominal pain.  Has been having intermittent fever and chills for the past several weeks.  Rash on right side of body.  Has been worked up for tickborne illness, blastomycosis.  Recent travel to [place] in [place].  No clear diagnosis at this time.  Feels feverish and has rigors.    Allergies:  No Known Allergies    Problem List:    There are no problems to display for this patient.       Past Medical History:    History reviewed. No pertinent past medical history.    Past Surgical History:    History reviewed. No pertinent surgical history.    Family History:    History reviewed. No pertinent family history.    Social History:  Marital Status:   [2]  Social History     Tobacco Use    Smoking status: Never    Smokeless tobacco: Never   Substance Use Topics    Alcohol use: Yes     Comment: monthly    Drug use: Yes     Types: Marijuana     Comment: rare        Medications:    albuterol (PROAIR HFA/PROVENTIL HFA/VENTOLIN HFA) 108 (90 Base) MCG/ACT inhaler  doxycycline hyclate (VIBRAMYCIN) 100 MG capsule  fish oil-omega-3 fatty acids 1000 MG capsule  ketoconazole (NIZORAL) 200 MG tablet  multivitamin w/minerals (THERA-VIT-M) tablet  nystatin (MYCOSTATIN) 717460 UNIT/ML suspension  WIXELA INHUB 250-50 MCG/ACT inhaler  rosuvastatin (CRESTOR) 5 MG tablet          Review of Systems   Constitutional:  Positive for chills and fever.   Respiratory:  Negative for shortness of breath.    Cardiovascular:  Negative for chest pain.   All other systems reviewed and are negative.      Physical Exam   BP: 134/83  Pulse: 74  Temp: 99.5  F (37.5  C)  Resp: 18  Weight: 93.4 kg (205 lb 14.6 oz)  SpO2: 97 %      Physical Exam  Constitutional:       General: He is not in acute distress.     Appearance: Normal appearance.   HENT:      Head: Normocephalic and atraumatic.       Right Ear: External ear normal.      Left Ear: External ear normal.      Nose: Nose normal. No rhinorrhea.   Eyes:      Conjunctiva/sclera: Conjunctivae normal.   Cardiovascular:      Rate and Rhythm: Normal rate and regular rhythm.      Pulses: Normal pulses.   Pulmonary:      Effort: Pulmonary effort is normal. No respiratory distress.      Breath sounds: Normal breath sounds.   Abdominal:      General: There is no distension.      Palpations: Abdomen is soft.      Tenderness: There is abdominal tenderness in the right upper quadrant.   Musculoskeletal:         General: No deformity or signs of injury.   Skin:     General: Skin is warm and dry.      Capillary Refill: Capillary refill takes less than 2 seconds.      Comments: Faint rash to posterior right back, no vesicles, blanches   Neurological:      General: No focal deficit present.      Mental Status: He is alert. Mental status is at baseline.   Psychiatric:         Mood and Affect: Mood normal.         Behavior: Behavior normal.         ED Course                 Procedures             Critical Care time:  was 69 minutes for this patient excluding procedures.             No results found for this or any previous visit (from the past 24 hour(s)).    Medications   acetaminophen (TYLENOL) tablet 975 mg (975 mg Oral $Given 10/20/23 2005)   sodium chloride 0.9% BOLUS 1,000 mL (0 mLs Intravenous Stopped 10/20/23 2108)   piperacillin-tazobactam (ZOSYN) 3.375 g vial to attach to  mL bag (3.375 g Intravenous $New Bag 10/20/23 2148)   metroNIDAZOLE (FLAGYL) infusion 500 mg (0 mg Intravenous Stopped 10/20/23 2234)       Assessments & Plan (with Medical Decision Making)     I have reviewed the nursing notes.    I have reviewed the findings, diagnosis, plan and need for follow up with the patient.    Critical Care Addendum  My initial assessment, based on my review of prehospital provider report, review of nursing observations, review of vital signs, focused  history, physical exam, and review of cardiac rhythm monitor, established a high suspicion that Rogelio Garcia has sepsis with indication for early goal-directed therapy, which requires immediate intervention, and therefore he is critically ill.     After the initial assessment, the care team initiated multiple lab tests, initiated IV fluid administration, and initiated medication therapy with flagyl, zosyn  to provide stabilization care. Due to the critical nature of this patient, I reassessed nursing observations, vital signs, physical exam, review of cardiac rhythm monitor, mental status, neurologic status, and respiratory status multiple times prior to his disposition.     Time also spent performing documentation, reviewing test results, and coordination of care.     Critical care time (excluding teaching time and procedures): 69 minutes.         Medical Decision Making  The patient's presentation was of moderate complexity (an undiagnosed new problem with uncertain diagnosis).    The patient's evaluation involved:  review of 3+ test result(s) ordered prior to this encounter (labs from recent ed visits)  ordering and/or review of 3+ test(s) in this encounter (see separate area of note for details)    The patient's management necessitated high risk (a decision regarding hospitalization).    68-year-old male here with 2 weeks of illness with constitutional symptoms.  Today, no formal diagnosis yet.  Questionable circular area on recent CT scan makes me concern for potential abscess.  Discussed case with [facility], specifically there because they have infectious disease and patient has no I identifiable pathogen causing all the symptoms.  We will treat empirically for liver abscess along with Zosyn.    Discharge Medication List as of 10/20/2023 10:38 PM          Final diagnoses:   Liver abscess       10/20/2023   [facility]       Gaston Quevedo MD  10/24/23 0527

## 2023-10-26 LAB
BACTERIA BLD CULT: NO GROWTH
BACTERIA BLD CULT: NO GROWTH

## 2023-11-03 ENCOUNTER — TELEPHONE (OUTPATIENT)
Dept: EMERGENCY MEDICINE | Facility: HOSPITAL | Age: 68
End: 2023-11-03

## 2023-11-03 NOTE — ED NOTES
ED Screening    PCP No-Patient is seeing specialists at [facility].  Will be getting established at  in [place], [place] state    Insurance Yes: Medicare    Medical Yes: Multiple medical concerns, wt loss, liver mass      Mental Health No    Substance Use No    Interventions No    Watch No    Close Yes: Will be following up with [facility]    No further interventions required.    DUTCH Drummond

## 2023-11-14 ENCOUNTER — TELEPHONE (OUTPATIENT)
Facility: CLINIC | Age: 68
End: 2023-11-14

## 2023-11-14 DIAGNOSIS — K75.0 BACTERIAL LIVER ABSCESS: Primary | ICD-10-CM

## 2023-11-14 NOTE — TELEPHONE ENCOUNTER
Nurse Danielle called from [facility] ([address] [facility]) to request follow up CT scan of abdomen and pelvis due to large liver abscess, states pt is discharged with need for follow up within 2 weeks, is on 4 weeks of oral antibiotics, and agrees to pt's records including discharge summary for review. Please call Danielle at [phone] if any questions.  Ronald

## 2023-11-16 NOTE — TELEPHONE ENCOUNTER
Records reviewed, will try to get patient's imaging scheduled near follow up visit so we can review results together.

## 2023-11-27 ENCOUNTER — OFFICE VISIT (OUTPATIENT)
Facility: CLINIC | Age: 68
End: 2023-11-27
Payer: MEDICARE

## 2023-11-27 VITALS
BODY MASS INDEX: 23.99 KG/M2 | SYSTOLIC BLOOD PRESSURE: 119 MMHG | HEIGHT: 76 IN | TEMPERATURE: 97.6 F | HEART RATE: 87 BPM | DIASTOLIC BLOOD PRESSURE: 80 MMHG | WEIGHT: 197 LBS | OXYGEN SATURATION: 98 % | RESPIRATION RATE: 20 BRPM

## 2023-11-27 DIAGNOSIS — I48.0 PAROXYSMAL A-FIB (HCC): ICD-10-CM

## 2023-11-27 DIAGNOSIS — Z09 HOSPITAL DISCHARGE FOLLOW-UP: ICD-10-CM

## 2023-11-27 DIAGNOSIS — K75.0 ABSCESS, LIVER: ICD-10-CM

## 2023-11-27 DIAGNOSIS — J45.30 MILD PERSISTENT ASTHMA WITHOUT COMPLICATION: ICD-10-CM

## 2023-11-27 DIAGNOSIS — E78.5 HYPERLIPIDEMIA, MILD: ICD-10-CM

## 2023-11-27 DIAGNOSIS — K75.0 ABSCESS, LIVER: Primary | ICD-10-CM

## 2023-11-27 PROCEDURE — G8428 CUR MEDS NOT DOCUMENT: HCPCS | Performed by: FAMILY MEDICINE

## 2023-11-27 PROCEDURE — 3017F COLORECTAL CA SCREEN DOC REV: CPT | Performed by: FAMILY MEDICINE

## 2023-11-27 PROCEDURE — 1111F DSCHRG MED/CURRENT MED MERGE: CPT | Performed by: FAMILY MEDICINE

## 2023-11-27 PROCEDURE — 4004F PT TOBACCO SCREEN RCVD TLK: CPT | Performed by: FAMILY MEDICINE

## 2023-11-27 PROCEDURE — 99214 OFFICE O/P EST MOD 30 MIN: CPT | Performed by: FAMILY MEDICINE

## 2023-11-27 PROCEDURE — G8420 CALC BMI NORM PARAMETERS: HCPCS | Performed by: FAMILY MEDICINE

## 2023-11-27 PROCEDURE — 1123F ACP DISCUSS/DSCN MKR DOCD: CPT | Performed by: FAMILY MEDICINE

## 2023-11-27 PROCEDURE — G8484 FLU IMMUNIZE NO ADMIN: HCPCS | Performed by: FAMILY MEDICINE

## 2023-11-27 RX ORDER — LEVOFLOXACIN 750 MG/1
TABLET, FILM COATED ORAL
COMMUNITY
Start: 2023-11-14

## 2023-11-27 RX ORDER — METRONIDAZOLE 500 MG/1
TABLET ORAL
COMMUNITY
Start: 2023-11-14

## 2023-11-27 RX ORDER — METOPROLOL SUCCINATE 25 MG/1
25 TABLET, EXTENDED RELEASE ORAL DAILY
COMMUNITY
End: 2023-11-28

## 2023-11-27 RX ORDER — ROSUVASTATIN CALCIUM 5 MG/1
5 TABLET, COATED ORAL WEEKLY
Qty: 30 TABLET | Refills: 0 | COMMUNITY
Start: 2023-11-27

## 2023-11-27 SDOH — ECONOMIC STABILITY: HOUSING INSECURITY
IN THE LAST 12 MONTHS, WAS THERE A TIME WHEN YOU DID NOT HAVE A STEADY PLACE TO SLEEP OR SLEPT IN A SHELTER (INCLUDING NOW)?: NO

## 2023-11-27 SDOH — ECONOMIC STABILITY: TRANSPORTATION INSECURITY
IN THE PAST 12 MONTHS, HAS LACK OF TRANSPORTATION KEPT YOU FROM MEETINGS, WORK, OR FROM GETTING THINGS NEEDED FOR DAILY LIVING?: NO

## 2023-11-27 SDOH — ECONOMIC STABILITY: INCOME INSECURITY: HOW HARD IS IT FOR YOU TO PAY FOR THE VERY BASICS LIKE FOOD, HOUSING, MEDICAL CARE, AND HEATING?: NOT HARD AT ALL

## 2023-11-27 SDOH — ECONOMIC STABILITY: FOOD INSECURITY: WITHIN THE PAST 12 MONTHS, YOU WORRIED THAT YOUR FOOD WOULD RUN OUT BEFORE YOU GOT MONEY TO BUY MORE.: NEVER TRUE

## 2023-11-27 SDOH — ECONOMIC STABILITY: FOOD INSECURITY: WITHIN THE PAST 12 MONTHS, THE FOOD YOU BOUGHT JUST DIDN'T LAST AND YOU DIDN'T HAVE MONEY TO GET MORE.: NEVER TRUE

## 2023-11-27 ASSESSMENT — PATIENT HEALTH QUESTIONNAIRE - PHQ9
SUM OF ALL RESPONSES TO PHQ QUESTIONS 1-9: 0
2. FEELING DOWN, DEPRESSED OR HOPELESS: 0
SUM OF ALL RESPONSES TO PHQ9 QUESTIONS 1 & 2: 0
SUM OF ALL RESPONSES TO PHQ QUESTIONS 1-9: 0
SUM OF ALL RESPONSES TO PHQ QUESTIONS 1-9: 0
1. LITTLE INTEREST OR PLEASURE IN DOING THINGS: 0
SUM OF ALL RESPONSES TO PHQ QUESTIONS 1-9: 0

## 2023-11-27 NOTE — ASSESSMENT & PLAN NOTE
Well-controlled, continue current medications and get CT scan of liver, and results will then go back to infectious disease doctor in [place].

## 2023-11-27 NOTE — ASSESSMENT & PLAN NOTE
Unclear control, changes made today: continue holding crestor, check in on effects of lifestyle changes.

## 2023-11-27 NOTE — PROGRESS NOTES
Post-Discharge Transitional Care Management Progress Note      Jeb Armas   YOB: 1955    Date of Office Visit:  11/27/2023       Care management risk score Rising risk (score 2-5) and Complex Care (Scores >=6): No Risk Score On File     Non face to face  following discharge, date last encounter closed (first attempt may have been earlier): *No documented post hospital discharge outreach found in the last 14 days *No documented post hospital discharge outreach found in the last 14 days    Call initiated 2 business days of discharge: *No response recorded in the last 14 days    ASSESSMENT/PLAN:   Abscess, liver  Assessment & Plan:   Well-controlled, continue current medications and get CT scan of liver, and results will then go back to infectious disease doctor in [place]. Orders:  -     CBC with Auto Differential; Future  -     Comprehensive Metabolic Panel; Future  -     Magnesium; Future  Paroxysmal A-fib [facility])  Assessment & Plan:   Asymptomatic, continue current medications and monitored by cardiology. will defer to them for management/treatment. Orders:  -     Magnesium; Future  -     Thyroid Cascade Profile; Future  -     Pulmonary function test; Future  Hyperlipidemia, mild  Assessment & Plan:   Unclear control, changes made today: continue holding crestor, check in on effects of lifestyle changes. Orders:  -     Lipid Panel; Future  Hospital discharge follow-up  -     PA DISCHARGE MEDS RECONCILED W/ CURRENT OUTPATIENT MED LIST  -     CBC with Auto Differential; Future  -     Comprehensive Metabolic Panel; Future  -     Magnesium; Future  -     Lipid Panel; Future  Mild persistent asthma without complication  Assessment & Plan:   Unclear control, continue current plan pending work up below PFT. Medical Decision Making: moderate complexity  No follow-ups on file.          Subjective:   HPI:  Follow up of Hospital problems/diagnosis(es): liver abscess    Inpatient course: Discharge

## 2023-11-27 NOTE — ASSESSMENT & PLAN NOTE
Asymptomatic, continue current medications and monitored by cardiology. will defer to them for management/treatment.

## 2023-11-27 NOTE — PROGRESS NOTES
Chief Complaint   Patient presents with    Follow-Up from [address] [place] [address] for \[facility] [place] [facility]\"" fu for liver abscess. Has liver ct scheduled this week. Would like general check up.

## 2023-11-28 LAB
ALBUMIN SERPL-MCNC: 3.3 G/DL (ref 3.5–5)
ALBUMIN/GLOB SERPL: 0.9 (ref 1.1–2.2)
ALP SERPL-CCNC: 81 U/L (ref 45–117)
ALT SERPL-CCNC: 18 U/L (ref 12–78)
ANION GAP SERPL CALC-SCNC: 6 MMOL/L (ref 5–15)
AST SERPL-CCNC: 21 U/L (ref 15–37)
BASOPHILS # BLD: 0.1 K/UL (ref 0–0.1)
BASOPHILS NFR BLD: 2 % (ref 0–1)
BILIRUB SERPL-MCNC: 0.4 MG/DL (ref 0.2–1)
BUN SERPL-MCNC: 24 MG/DL (ref 6–20)
BUN/CREAT SERPL: 38 (ref 12–20)
CALCIUM SERPL-MCNC: 9.3 MG/DL (ref 8.5–10.1)
CHLORIDE SERPL-SCNC: 103 MMOL/L (ref 97–108)
CHOLEST SERPL-MCNC: 165 MG/DL
CO2 SERPL-SCNC: 28 MMOL/L (ref 21–32)
CREAT SERPL-MCNC: 0.63 MG/DL (ref 0.7–1.3)
DIFFERENTIAL METHOD BLD: ABNORMAL
EOSINOPHIL # BLD: 0.4 K/UL (ref 0–0.4)
EOSINOPHIL NFR BLD: 11 % (ref 0–7)
ERYTHROCYTE [DISTWIDTH] IN BLOOD BY AUTOMATED COUNT: 14 % (ref 11.5–14.5)
GLOBULIN SER CALC-MCNC: 3.6 G/DL (ref 2–4)
GLUCOSE SERPL-MCNC: 87 MG/DL (ref 65–100)
HCT VFR BLD AUTO: 38.7 % (ref 36.6–50.3)
HDLC SERPL-MCNC: 52 MG/DL
HDLC SERPL: 3.2 (ref 0–5)
HGB BLD-MCNC: 12.5 G/DL (ref 12.1–17)
IMM GRANULOCYTES # BLD AUTO: 0 K/UL
IMM GRANULOCYTES NFR BLD AUTO: 0 %
LDLC SERPL CALC-MCNC: 95 MG/DL (ref 0–100)
LYMPHOCYTES # BLD: 1.5 K/UL (ref 0.8–3.5)
LYMPHOCYTES NFR BLD: 46 % (ref 12–49)
MAGNESIUM SERPL-MCNC: 2 MG/DL (ref 1.6–2.4)
MCH RBC QN AUTO: 28.9 PG (ref 26–34)
MCHC RBC AUTO-ENTMCNC: 32.3 G/DL (ref 30–36.5)
MCV RBC AUTO: 89.6 FL (ref 80–99)
MONOCYTES # BLD: 0.5 K/UL (ref 0–1)
MONOCYTES NFR BLD: 13 % (ref 5–13)
NEUTS SEG # BLD: 1 K/UL (ref 1.8–8)
NEUTS SEG NFR BLD: 28 % (ref 32–75)
NRBC # BLD: 0 K/UL (ref 0–0.01)
NRBC BLD-RTO: 0 PER 100 WBC
PLATELET # BLD AUTO: 235 K/UL (ref 150–400)
PMV BLD AUTO: 11.2 FL (ref 8.9–12.9)
POTASSIUM SERPL-SCNC: 4.8 MMOL/L (ref 3.5–5.1)
PROT SERPL-MCNC: 6.9 G/DL (ref 6.4–8.2)
RBC # BLD AUTO: 4.32 M/UL (ref 4.1–5.7)
RBC MORPH BLD: ABNORMAL
RBC MORPH BLD: ABNORMAL
SODIUM SERPL-SCNC: 137 MMOL/L (ref 136–145)
TRIGL SERPL-MCNC: 90 MG/DL
VLDLC SERPL CALC-MCNC: 18 MG/DL
WBC # BLD AUTO: 3.5 K/UL (ref 4.1–11.1)

## 2023-11-28 RX ORDER — METOPROLOL SUCCINATE 100 MG/1
100 TABLET, EXTENDED RELEASE ORAL DAILY
COMMUNITY
Start: 2023-11-28

## 2023-11-29 ENCOUNTER — HOSPITAL ENCOUNTER (OUTPATIENT)
Facility: HOSPITAL | Age: 68
Discharge: HOME | End: 2023-12-02
Attending: FAMILY MEDICINE
Payer: MEDICARE

## 2023-11-29 DIAGNOSIS — K75.0 BACTERIAL LIVER ABSCESS: ICD-10-CM

## 2023-11-29 LAB — TSH SERPL DL<=0.05 MIU/L-ACNC: 2.04 UIU/ML (ref 0.45–4.5)

## 2023-11-29 PROCEDURE — 6360000004 HC RX CONTRAST MEDICATION: Performed by: FAMILY MEDICINE

## 2023-11-29 PROCEDURE — 74177 CT ABD & PELVIS W/CONTRAST: CPT

## 2023-11-29 RX ADMIN — IOPAMIDOL 100 ML: 755 INJECTION, SOLUTION INTRAVENOUS at 14:27

## 2023-11-29 ASSESSMENT — ENCOUNTER SYMPTOMS
EYES NEGATIVE: 1
GASTROINTESTINAL NEGATIVE: 1
CARDIOVASCULAR NEGATIVE: 1
ENDOCRINE NEGATIVE: 1
FATIGUE: 1
UNEXPECTED WEIGHT CHANGE: 1
RESPIRATORY NEGATIVE: 1

## 2024-01-13 ENCOUNTER — HEALTH MAINTENANCE LETTER (OUTPATIENT)
Age: 69
End: 2024-01-13

## 2024-02-05 ENCOUNTER — HOSPITAL ENCOUNTER (OUTPATIENT)
Facility: HOSPITAL | Age: 69
Discharge: HOME | End: 2024-02-08
Attending: FAMILY MEDICINE
Payer: MEDICARE

## 2024-02-05 VITALS — RESPIRATION RATE: 15 BRPM | HEART RATE: 55 BPM | OXYGEN SATURATION: 97 %

## 2024-02-05 PROCEDURE — 94726 PLETHYSMOGRAPHY LUNG VOLUMES: CPT

## 2024-02-05 PROCEDURE — 94060 EVALUATION OF WHEEZING: CPT

## 2024-02-05 PROCEDURE — 6370000000 HC RX 637 (ALT 250 FOR IP): Performed by: FAMILY MEDICINE

## 2024-02-05 PROCEDURE — 94640 AIRWAY INHALATION TREATMENT: CPT

## 2024-02-05 PROCEDURE — 94729 DIFFUSING CAPACITY: CPT

## 2024-02-05 RX ORDER — ALBUTEROL SULFATE 90 UG/1
2 AEROSOL, METERED RESPIRATORY (INHALATION) ONCE
Status: COMPLETED | OUTPATIENT
Start: 2024-02-05 | End: 2024-02-05

## 2024-02-05 RX ADMIN — ALBUTEROL SULFATE 2 PUFF: 108 INHALANT RESPIRATORY (INHALATION) at 11:48

## 2024-02-07 NOTE — PROCEDURES
[facility]  PULMONARY FUNCTION TEST    Name:  BHANU LANDRUM  MR#:  [id number]  :  1955  ACCOUNT #:  [id number]  DATE OF SERVICE:  2024      FEV1 to FVC ratio is 65 with an FEV1 of 72%.  FVC of 84%.  Total lung capacity of 87%.  Overall, the patient has moderate obstructive disease with good reversibility with bronchodilators.      KENNETH MOYA MD [id number]  D:  2024 13:41  T:  2024 16:41  JOB #:  [id number]

## 2024-05-07 ENCOUNTER — OFFICE VISIT (OUTPATIENT)
Facility: CLINIC | Age: 69
End: 2024-05-07

## 2024-05-07 VITALS
WEIGHT: 207.4 LBS | HEIGHT: 76 IN | RESPIRATION RATE: 16 BRPM | OXYGEN SATURATION: 98 % | HEART RATE: 68 BPM | DIASTOLIC BLOOD PRESSURE: 83 MMHG | BODY MASS INDEX: 25.25 KG/M2 | TEMPERATURE: 98.1 F | SYSTOLIC BLOOD PRESSURE: 130 MMHG

## 2024-05-07 DIAGNOSIS — I48.0 PAROXYSMAL A-FIB (HCC): Primary | ICD-10-CM

## 2024-05-07 DIAGNOSIS — E78.5 HYPERLIPIDEMIA, MILD: ICD-10-CM

## 2024-05-07 DIAGNOSIS — N52.9 ERECTILE DYSFUNCTION, UNSPECIFIED ERECTILE DYSFUNCTION TYPE: ICD-10-CM

## 2024-05-07 DIAGNOSIS — J45.30 MILD PERSISTENT ASTHMA WITHOUT COMPLICATION: ICD-10-CM

## 2024-05-07 DIAGNOSIS — K75.0 ABSCESS, LIVER: ICD-10-CM

## 2024-05-07 RX ORDER — FLUTICASONE PROPIONATE AND SALMETEROL 250; 50 UG/1; UG/1
1 POWDER RESPIRATORY (INHALATION) 2 TIMES DAILY
Qty: 60 EACH | Refills: 3 | Status: SHIPPED | OUTPATIENT
Start: 2024-05-07

## 2024-05-07 RX ORDER — VARDENAFIL HYDROCHLORIDE 10 MG/1
10 TABLET ORAL PRN
Qty: 80 TABLET | Refills: 0 | Status: SHIPPED | OUTPATIENT
Start: 2024-05-07 | End: 2024-07-26

## 2024-05-07 RX ORDER — VARDENAFIL HYDROCHLORIDE 10 MG/1
10 TABLET ORAL PRN
COMMUNITY
End: 2024-05-07 | Stop reason: SDUPTHER

## 2024-05-07 RX ORDER — FLUTICASONE PROPIONATE AND SALMETEROL 250; 50 UG/1; UG/1
1 POWDER RESPIRATORY (INHALATION) 2 TIMES DAILY
COMMUNITY
End: 2024-05-07 | Stop reason: SDUPTHER

## 2024-05-07 ASSESSMENT — PATIENT HEALTH QUESTIONNAIRE - PHQ9
SUM OF ALL RESPONSES TO PHQ QUESTIONS 1-9: 0
2. FEELING DOWN, DEPRESSED OR HOPELESS: NOT AT ALL
SUM OF ALL RESPONSES TO PHQ9 QUESTIONS 1 & 2: 0
1. LITTLE INTEREST OR PLEASURE IN DOING THINGS: NOT AT ALL
SUM OF ALL RESPONSES TO PHQ QUESTIONS 1-9: 0

## 2024-05-07 NOTE — PATIENT INSTRUCTIONS
If you experience the following symptoms please call 9-1-1 immediately:  Heart attack symptoms  Tightness, pressure, squeezing, stabbing, or dull pain, most often in the center of the chest  Pain that spreads to the shoulders, neck, or arms  Irregular or rapid heartbeat  Cold sweat or clammy skin  Lightheadedness, weakness, or dizziness  Shortness of breath  Nausea, indigestion, and sometimes vomiting

## 2024-05-07 NOTE — PROGRESS NOTES
Chief Complaint   Patient presents with    Hyperlipidemia     Jong Stewart is a 68 y.o. male who presents for a follow up on mixed hyperlipidemia.      \"Have you been to the ER, urgent care clinic since your last visit?  Hospitalized since your last visit?\"    NO    “Have you seen or consulted any other health care providers outside of [facility] since your last visit?”    NO    “Have you had a colorectal cancer screening such as a colonoscopy/FIT/Cologuard?    NO due in a few days. He reports he does this in [place]     No colonoscopy on file  No cologuard on file  No FIT/FOBT on file   No flexible sigmoidoscopy on file

## 2024-05-07 NOTE — PROGRESS NOTES
Family Medicine Follow-Up Progress Note   [facility]    Patient Jong Stewart  1955, 68 y.o., male  Encounter Date 05/07/24    ASSESSMENT AND PLAN:     Assessment & Plan      1. Paroxysmal A-fib (HCC)  Resolved, BP and HR well-controlled. Requested cardiology records from Dr. Akers. Stopped metoprolol.  - Comprehensive Metabolic Panel; Future  - CBC with Auto Differential; Future    2. Hyperlipidemia, mild  Stopped rosuvastatin per pt choice, declines statin.    3. Erectile dysfunction, unspecified erectile dysfunction type  Pt requests a refill of their medication, which has been printed, as he looks for best price.  - vardenafil (LEVITRA) 10 MG tablet; Take 1 tablet by mouth as needed for Erectile Dysfunction  Dispense: 80 tablet; Refill: 0    4. Mild persistent asthma without complication  Stable, lungs CTAB. Pt requests a refill of their medication, which has been sent.  - albuterol sulfate (PROAIR RESPICLICK) 108 (90 Base) MCG/ACT aerosol powder inhalation; Inhale 2 puffs into the lungs every 4 hours as needed for Wheezing or Shortness of Breath  Dispense: 3 each; Refill: 3  - fluticasone-salmeterol (ADVAIR) 250-50 MCG/ACT AEPB diskus inhaler; Inhale 1 puff into the lungs 2 times daily  Dispense: 60 each; Refill: 3    5. Abscess, liver  Resolved     Orders Placed This Encounter    Comprehensive Metabolic Panel     Standing Status:   Future     Standing Expiration Date:   5/7/2025    CBC with Auto Differential     Standing Status:   Future     Standing Expiration Date:   5/7/2025    DISCONTD: albuterol sulfate (PROAIR RESPICLICK) 108 (90 Base) MCG/ACT aerosol powder inhalation     Sig: Inhale 2 puffs into the lungs every 4 hours as needed for Wheezing or Shortness of Breath    DISCONTD: fluticasone-salmeterol (ADVAIR) 250-50 MCG/ACT AEPB diskus inhaler     Sig: Inhale 1 puff into the lungs 2 times daily    DISCONTD: vardenafil (LEVITRA) 10 MG tablet     Sig: Take 1 tablet by mouth

## 2024-10-17 ENCOUNTER — TELEPHONE (OUTPATIENT)
Facility: CLINIC | Age: 69
End: 2024-10-17

## 2025-01-21 DIAGNOSIS — N52.9 ERECTILE DYSFUNCTION, UNSPECIFIED ERECTILE DYSFUNCTION TYPE: ICD-10-CM

## 2025-01-22 RX ORDER — VARDENAFIL HYDROCHLORIDE 10 MG/1
10 TABLET ORAL PRN
Qty: 80 TABLET | Refills: 0 | Status: SHIPPED | OUTPATIENT
Start: 2025-01-22 | End: 2025-04-12

## 2025-01-26 ENCOUNTER — HEALTH MAINTENANCE LETTER (OUTPATIENT)
Age: 70
End: 2025-01-26

## 2025-03-04 DIAGNOSIS — J45.30 MILD PERSISTENT ASTHMA WITHOUT COMPLICATION: ICD-10-CM

## 2025-03-05 RX ORDER — FLUTICASONE PROPIONATE AND SALMETEROL 250; 50 UG/1; UG/1
1 POWDER RESPIRATORY (INHALATION) 2 TIMES DAILY
Qty: 180 EACH | Refills: 3 | Status: SHIPPED | OUTPATIENT
Start: 2025-03-05

## 2025-03-17 ENCOUNTER — COMMUNITY OUTREACH (OUTPATIENT)
Facility: CLINIC | Age: 70
End: 2025-03-17